# Patient Record
Sex: MALE | Race: WHITE | Employment: FULL TIME | URBAN - METROPOLITAN AREA
[De-identification: names, ages, dates, MRNs, and addresses within clinical notes are randomized per-mention and may not be internally consistent; named-entity substitution may affect disease eponyms.]

---

## 2017-10-23 LAB
CHOLESTEROL, TOTAL: NORMAL MG/DL
CHOLESTEROL/HDL RATIO: NORMAL
HDLC SERPL-MCNC: NORMAL MG/DL (ref 35–70)
LDL CHOLESTEROL CALCULATED: NORMAL MG/DL (ref 0–160)
TRIGL SERPL-MCNC: NORMAL MG/DL
VLDLC SERPL CALC-MCNC: NORMAL MG/DL

## 2019-03-27 ENCOUNTER — APPOINTMENT (OUTPATIENT)
Dept: CT IMAGING | Age: 64
End: 2019-03-27
Payer: OTHER GOVERNMENT

## 2019-03-27 ENCOUNTER — HOSPITAL ENCOUNTER (EMERGENCY)
Age: 64
Discharge: HOME | End: 2019-03-27
Attending: EMERGENCY MEDICINE
Payer: OTHER GOVERNMENT

## 2019-03-27 VITALS
HEART RATE: 67 BPM | BODY MASS INDEX: 30.31 KG/M2 | RESPIRATION RATE: 16 BRPM | HEIGHT: 68 IN | OXYGEN SATURATION: 95 % | WEIGHT: 200 LBS | DIASTOLIC BLOOD PRESSURE: 79 MMHG | TEMPERATURE: 98.6 F | SYSTOLIC BLOOD PRESSURE: 130 MMHG

## 2019-03-27 DIAGNOSIS — N40.0 BENIGN PROSTATIC HYPERPLASIA WITHOUT LOWER URINARY TRACT SYMPTOMS: ICD-10-CM

## 2019-03-27 DIAGNOSIS — M51.37 DEGENERATIVE DISC DISEASE AT L5-S1 LEVEL: ICD-10-CM

## 2019-03-27 DIAGNOSIS — R10.32 ABDOMINAL PAIN, LEFT LOWER QUADRANT: Primary | ICD-10-CM

## 2019-03-27 DIAGNOSIS — K76.0 HEPATIC STEATOSIS: ICD-10-CM

## 2019-03-27 LAB
ALBUMIN SERPL-MCNC: 4.5 G/DL (ref 3.5–5.2)
ALP BLD-CCNC: 55 U/L (ref 40–129)
ALT SERPL-CCNC: 20 U/L (ref 0–40)
ANION GAP SERPL CALCULATED.3IONS-SCNC: 8 MMOL/L (ref 7–16)
AST SERPL-CCNC: 23 U/L (ref 0–39)
BASOPHILS ABSOLUTE: 0.03 E9/L (ref 0–0.2)
BASOPHILS RELATIVE PERCENT: 0.4 % (ref 0–2)
BILIRUB SERPL-MCNC: 0.9 MG/DL (ref 0–1.2)
BILIRUBIN URINE: NEGATIVE
BLOOD, URINE: NEGATIVE
BUN BLDV-MCNC: 20 MG/DL (ref 8–23)
CALCIUM SERPL-MCNC: 9.5 MG/DL (ref 8.6–10.2)
CHLORIDE BLD-SCNC: 102 MMOL/L (ref 98–107)
CLARITY: CLEAR
CO2: 28 MMOL/L (ref 22–29)
COLOR: NORMAL
CREAT SERPL-MCNC: 1.1 MG/DL (ref 0.7–1.2)
EOSINOPHILS ABSOLUTE: 0.15 E9/L (ref 0.05–0.5)
EOSINOPHILS RELATIVE PERCENT: 2.2 % (ref 0–6)
GFR AFRICAN AMERICAN: >60
GFR NON-AFRICAN AMERICAN: >60 ML/MIN/1.73
GLUCOSE BLD-MCNC: 110 MG/DL (ref 74–99)
GLUCOSE URINE: NEGATIVE MG/DL
HCT VFR BLD CALC: 43.2 % (ref 37–54)
HEMOGLOBIN: 14.8 G/DL (ref 12.5–16.5)
IMMATURE GRANULOCYTES #: 0.01 E9/L
IMMATURE GRANULOCYTES %: 0.1 % (ref 0–5)
KETONES, URINE: NEGATIVE MG/DL
LACTIC ACID: 1.2 MMOL/L (ref 0.5–2.2)
LEUKOCYTE ESTERASE, URINE: NEGATIVE
LIPASE: 29 U/L (ref 13–60)
LYMPHOCYTES ABSOLUTE: 1.21 E9/L (ref 1.5–4)
LYMPHOCYTES RELATIVE PERCENT: 18 % (ref 20–42)
MCH RBC QN AUTO: 32.5 PG (ref 26–35)
MCHC RBC AUTO-ENTMCNC: 34.3 % (ref 32–34.5)
MCV RBC AUTO: 94.7 FL (ref 80–99.9)
MONOCYTES ABSOLUTE: 0.66 E9/L (ref 0.1–0.95)
MONOCYTES RELATIVE PERCENT: 9.8 % (ref 2–12)
NEUTROPHILS ABSOLUTE: 4.65 E9/L (ref 1.8–7.3)
NEUTROPHILS RELATIVE PERCENT: 69.5 % (ref 43–80)
NITRITE, URINE: NEGATIVE
PDW BLD-RTO: 13.3 FL (ref 11.5–15)
PH UA: 7 (ref 5–9)
PLATELET # BLD: 136 E9/L (ref 130–450)
PMV BLD AUTO: 11.3 FL (ref 7–12)
POTASSIUM SERPL-SCNC: 4.4 MMOL/L (ref 3.5–5)
PROTEIN UA: NEGATIVE MG/DL
RBC # BLD: 4.56 E12/L (ref 3.8–5.8)
SODIUM BLD-SCNC: 138 MMOL/L (ref 132–146)
SPECIFIC GRAVITY UA: 1.01 (ref 1–1.03)
TOTAL PROTEIN: 6.9 G/DL (ref 6.4–8.3)
TSH SERPL DL<=0.05 MIU/L-ACNC: 1.93 UIU/ML (ref 0.27–4.2)
UROBILINOGEN, URINE: 0.2 E.U./DL
WBC # BLD: 6.7 E9/L (ref 4.5–11.5)

## 2019-03-27 PROCEDURE — 2580000003 HC RX 258: Performed by: EMERGENCY MEDICINE

## 2019-03-27 PROCEDURE — 83605 ASSAY OF LACTIC ACID: CPT

## 2019-03-27 PROCEDURE — 81003 URINALYSIS AUTO W/O SCOPE: CPT

## 2019-03-27 PROCEDURE — 74177 CT ABD & PELVIS W/CONTRAST: CPT

## 2019-03-27 PROCEDURE — 99284 EMERGENCY DEPT VISIT MOD MDM: CPT

## 2019-03-27 PROCEDURE — 83690 ASSAY OF LIPASE: CPT

## 2019-03-27 PROCEDURE — 84443 ASSAY THYROID STIM HORMONE: CPT

## 2019-03-27 PROCEDURE — 80053 COMPREHEN METABOLIC PANEL: CPT

## 2019-03-27 PROCEDURE — 85025 COMPLETE CBC W/AUTO DIFF WBC: CPT

## 2019-03-27 PROCEDURE — 6360000004 HC RX CONTRAST MEDICATION: Performed by: RADIOLOGY

## 2019-03-27 RX ORDER — TAMSULOSIN HYDROCHLORIDE 0.4 MG/1
0.4 CAPSULE ORAL DAILY
COMMUNITY
End: 2019-06-24 | Stop reason: SDUPTHER

## 2019-03-27 RX ORDER — OXYMETAZOLINE HYDROCHLORIDE 0.05 G/100ML
2 SPRAY NASAL 2 TIMES DAILY
COMMUNITY
End: 2019-06-24 | Stop reason: SDUPTHER

## 2019-03-27 RX ORDER — MONTELUKAST SODIUM 10 MG/1
10 TABLET ORAL DAILY
COMMUNITY
End: 2019-12-18 | Stop reason: SDUPTHER

## 2019-03-27 RX ORDER — OMEPRAZOLE 40 MG/1
40 CAPSULE, DELAYED RELEASE ORAL DAILY
COMMUNITY
End: 2019-08-23

## 2019-03-27 RX ORDER — SODIUM CHLORIDE 0.9 % (FLUSH) 0.9 %
10 SYRINGE (ML) INJECTION PRN
Status: DISCONTINUED | OUTPATIENT
Start: 2019-03-27 | End: 2019-03-27 | Stop reason: HOSPADM

## 2019-03-27 RX ADMIN — Medication 10 ML: at 11:20

## 2019-03-27 RX ADMIN — IOPAMIDOL 110 ML: 755 INJECTION, SOLUTION INTRAVENOUS at 12:20

## 2019-03-27 ASSESSMENT — PAIN DESCRIPTION - PAIN TYPE: TYPE: ACUTE PAIN

## 2019-03-27 ASSESSMENT — ENCOUNTER SYMPTOMS
CONSTIPATION: 1
EYE REDNESS: 0
SHORTNESS OF BREATH: 0
SINUS PRESSURE: 0
SORE THROAT: 0
EYE DISCHARGE: 0
DIARRHEA: 0
ABDOMINAL PAIN: 1
WHEEZING: 0
NAUSEA: 0
COUGH: 0
ANAL BLEEDING: 1
EYE PAIN: 0
HEMATOCHEZIA: 1
VOMITING: 0
BACK PAIN: 0
BELCHING: 0

## 2019-03-27 ASSESSMENT — PAIN DESCRIPTION - LOCATION: LOCATION: ABDOMEN

## 2019-03-27 ASSESSMENT — PAIN DESCRIPTION - DESCRIPTORS: DESCRIPTORS: DISCOMFORT

## 2019-03-27 ASSESSMENT — PAIN DESCRIPTION - ORIENTATION: ORIENTATION: LEFT;LOWER

## 2019-03-27 ASSESSMENT — PAIN SCALES - GENERAL: PAINLEVEL_OUTOF10: 3

## 2019-05-08 ENCOUNTER — OFFICE VISIT (OUTPATIENT)
Dept: FAMILY MEDICINE CLINIC | Age: 64
End: 2019-05-08
Payer: OTHER GOVERNMENT

## 2019-05-08 VITALS
DIASTOLIC BLOOD PRESSURE: 82 MMHG | HEART RATE: 76 BPM | BODY MASS INDEX: 31.25 KG/M2 | TEMPERATURE: 97.8 F | WEIGHT: 206.2 LBS | HEIGHT: 68 IN | OXYGEN SATURATION: 97 % | SYSTOLIC BLOOD PRESSURE: 126 MMHG

## 2019-05-08 DIAGNOSIS — M51.37 DEGENERATIVE DISC DISEASE AT L5-S1 LEVEL: ICD-10-CM

## 2019-05-08 DIAGNOSIS — S61.309A: Primary | ICD-10-CM

## 2019-05-08 DIAGNOSIS — J30.9 CHRONIC ALLERGIC RHINITIS: ICD-10-CM

## 2019-05-08 PROBLEM — M51.379 DEGENERATIVE DISC DISEASE AT L5-S1 LEVEL: Status: ACTIVE | Noted: 2019-05-08

## 2019-05-08 PROCEDURE — 99213 OFFICE O/P EST LOW 20 MIN: CPT | Performed by: NURSE PRACTITIONER

## 2019-05-08 PROCEDURE — 10120 INC&RMVL FB SUBQ TISS SMPL: CPT | Performed by: NURSE PRACTITIONER

## 2019-05-08 RX ORDER — CYCLOBENZAPRINE HCL 10 MG
10 TABLET ORAL NIGHTLY PRN
Qty: 30 TABLET | Refills: 0 | Status: SHIPPED | OUTPATIENT
Start: 2019-05-08 | End: 2019-05-18

## 2019-05-08 ASSESSMENT — ENCOUNTER SYMPTOMS
BACK PAIN: 1
RESPIRATORY NEGATIVE: 1
EYES NEGATIVE: 1
RHINORRHEA: 1

## 2019-05-08 NOTE — LETTER
[address]  [facility] [address]  Phone: [phone]  Fax: [address] [place] [address], JACQUELINE - CNP        May 8, 2019     Patient: Ketan Horta   YOB: 1955   Date of Visit: 5/8/2019       To Whom It May Concern: It is my medical opinion that Ketan Horta should remain out of work until 6-1-19. If you have any questions or concerns, please don't hesitate to call.     Sincerely,        Rolando Hernandez, APRN - CNP

## 2019-05-08 NOTE — PATIENT INSTRUCTIONS
Patient Education        Back Pain: Care Instructions  Your Care Instructions    Back pain has many possible causes. It is often related to problems with muscles and ligaments of the back. It may also be related to problems with the nerves, discs, or bones of the back. Moving, lifting, standing, sitting, or sleeping in an awkward way can strain the back. Sometimes you don't notice the injury until later. Arthritis is another common cause of back pain. Although it may hurt a lot, back pain usually improves on its own within several weeks. Most people recover in 12 weeks or less. Using good home treatment and being careful not to stress your back can help you feel better sooner. Follow-up care is a key part of your treatment and safety. Be sure to make and go to all appointments, and call your doctor if you are having problems. It's also a good idea to know your test results and keep a list of the medicines you take. How can you care for yourself at home? · Sit or lie in positions that are most comfortable and reduce your pain. Try one of these positions when you lie down:  ? Lie on your back with your knees bent and supported by large pillows. ? Lie on the floor with your legs on the seat of a sofa or chair. ? Lie on your side with your knees and hips bent and a pillow between your legs. ? Lie on your stomach if it does not make pain worse. · Do not sit up in bed, and avoid soft couches and twisted positions. Bed rest can help relieve pain at first, but it delays healing. Avoid bed rest after the first day of back pain. · Change positions every 30 minutes. If you must sit for long periods of time, take breaks from sitting. Get up and walk around, or lie in a comfortable position. · Try using a heating pad on a low or medium setting for 15 to 20 minutes every 2 or 3 hours. Try a warm shower in place of one session with the heating pad.   · You can also try an ice pack for 10 to 15 minutes every 2 to 3 hours. Put a thin cloth between the ice pack and your skin. · Take pain medicines exactly as directed. ? If the doctor gave you a prescription medicine for pain, take it as prescribed. ? If you are not taking a prescription pain medicine, ask your doctor if you can take an over-the-counter medicine. · Take short walks several times a day. You can start with 5 to 10 minutes, 3 or 4 times a day, and work up to longer walks. Walk on level surfaces and avoid hills and stairs until your back is better. · Return to work and other activities as soon as you can. Continued rest without activity is usually not good for your back. · To prevent future back pain, do exercises to stretch and strengthen your back and stomach. Learn how to use good posture, safe lifting techniques, and proper body mechanics. When should you call for help? Call your doctor now or seek immediate medical care if:    · You have new or worsening numbness in your legs.     · You have new or worsening weakness in your legs. (This could make it hard to stand up.)     · You lose control of your bladder or bowels.    Watch closely for changes in your health, and be sure to contact your doctor if:    · You have a fever, lose weight, or don't feel well.     · You do not get better as expected. Where can you learn more? Go to https://Exit41.Repairogen. org and sign in to your Movetis account. Enter P225 in the [facility] box to learn more about \"Back Pain: Care Instructions. \"     If you do not have an account, please click on the \"Sign Up Now\" link. Current as of: September 20, 2018  Content Version: 12.0  © 0378-6243 Healthwise, Incorporated. Care instructions adapted under license by [facility] ([facility]). If you have questions about a medical condition or this instruction, always ask your healthcare professional. [place] [address] any warranty or liability for your use of this information.          Patient Education sign in to your Stockdrift account. Enter O580 in the [employer] box to learn more about \"Back Stretches: Exercises. \"     If you do not have an account, please click on the \"Sign Up Now\" link. Current as of: September 20, 2018  Content Version: 12.0  © 6187-8491 [employer]. Care instructions adapted under license by [facility] ([facility]). If you have questions about a medical condition or this instruction, always ask your healthcare professional. [address] any warranty or liability for your use of this information. Patient Education        Therapeutic Ball: Back Exercises  Your Care Instructions  Here are some examples of typical exercises for your condition. Start each exercise slowly. Ease off the exercise if you start to have pain. Your doctor or physical therapist will tell you when you can start these exercises and which ones will work best for you. To prepare, make sure that your ball is the right size for you. When inflated and firm, it should allow you to sit with your hips and knees bent at about a 90-degree angle (like the letter L). How to do the exercises  Seated position on ball    1. Use this exercise to get used to moving on the ball and to find your best sitting position. 2. Sit comfortably on the ball with your feet about hip-width apart. If you feel unsteady, rest your hands on the ball near your hips. 3. As you do this exercise, try to keep your shoulders and upper body relaxed and still. 4. Using your stomach and back muscles to move your pelvis, roll the ball forward. This will round your back. 5. Still using your stomach and back muscles, roll the ball back. You will arch your back. 6. Repeat this rounding-arching motion a few times. 7. Stop in between the two positions, where your back is not rounded or arched. This is called your neutral position. Pelvic rotation    1. Sit tall on the ball.   2. Slowly rotate your hips in a [place] pattern. Keep the movement focused at your hips. 3. Repeat, but [place] in the other direction. 4. Repeat 8 to 12 times. Postural sitting    1. Use this position to find a stable, relaxed posture on the ball. You can use this position as your starting point for other ball exercises. If you feel unsteady on the ball, start on a chair first.  2. Sit on a ball or chair, with your feet planted straight in front of you. 3. Imagine that a string at the top of your head is pulling you straight up. Think of yourself as 2 inches taller than you are.  4. Slightly tuck your chin. 5. Keep your shoulders back and relaxed. Knee extension    1. Sit tall on the ball with your feet planted in front of you, hip-width apart. As you do this exercise, avoid slumping your shoulders and arching your back. 2. Rest your hands on the ball near your hip or a steady object next to you. (If you feel very stable on the ball, rest your hands in your lap or at your side.)  3. Slowly straighten one leg at the knee. Slowly lower it back down. Repeat with the other leg. 4. Repeat this exercise 8 to 12 times. Roll-ups    1. Lie on your back with your knees bent, feet resting on the floor. 2. Lay the ball on your thighs. Rest your hands up high on the ball. 3. Raising your head and shoulder blades, roll the ball up your thighs. Exhale as you roll up. 4. If this is hard on your neck, gently support your lower head and upper neck with one hand. Don't use that hand to pull your head up. 5. Repeat 8 to 12 times. Ball curls    1. Lie on your back with your ankles resting on the ball, knees straight. 2. Use your legs to roll the exercise ball toward you. Allow your knees to bend and move closer to your chest.  3. Pause briefly, and then roll the ball to the starting position. Try to keep the ball rolling straight. You will feel the muscles in your lower belly working. 4. Repeat 8 to 12 times. Bridge with ball under legs    1.  Lie on your back with your legs up, calves resting on the ball. For more challenge, rest your heels on the ball. 2. Look up at the ceiling, and keep your chin relaxed. You can place a small pillow under your head or neck for comfort. 3. With your arms by your side, press your hands onto the floor for stability. 4. Tighten your belly muscles by pulling in your belly button toward your spine. 5. Push your heels down toward the floor, squeeze your buttocks, and lift your hips off the floor until your shoulders, hips, and knees are all in a straight line. 6. Try to keep the ball steady. Hold for about 6 seconds as you continue to breathe normally. 7. Slowly lower your hips back down to the floor. 8. Repeat 8 to 12 times. Ball curls with bridge    1. Start flat on your back with your ankles resting on the ball. 2. Look up at the ceiling, and keep your chin relaxed. You can place a small pillow under your head or neck for comfort. 3. With your arms by your side, press your hands onto the floor for stability. 4. Tighten your belly muscles by pulling in your belly button toward your spine. 5. Push your heels down toward the floor, squeeze your buttocks, and lift your hips off the floor until your shoulders, hips, and knees are all in a straight line. 6. While holding the bridge position, roll the ball toward you with your heels. Keep your hips as level as you can. 7. Pause briefly, and then roll the ball back out. Try to keep the ball rolling straight. You will feel the muscles in your lower belly working as you straighten your legs. 8. Lower your hips, and return to your starting position. 9. Repeat 8 to 12 times. 10. When you can keep your body and the ball steady throughout this exercise, you're ready for more challenge. Try keeping your hips raised while rolling the ball out, holding the bridge, and rolling back, a few times in a row. Praying idris    1.  Kneel upright with the ball in front of you.  2. To start, clasp your hands together. Rest them on the ball in front of you. 3. As you do this exercise, keep your back and hips straight and tighten your belly and buttocks muscles. Keep your knees in place. 4. Press on the ball with your arms. Lean forward from the knees. This rolls the ball forward. You will bear most of your weight on your arms. 5. If your back starts to ache, you've gone too far. Pull back a bit. 6. Roll back to the start position. 7. Repeat 8 to 12 times. Walk-out plank on ball    1. Kneel over the ball. Place your hands on the floor in front of you. 2. Walk your hands forward until your legs are straight on the ball. This is the plank position. 3. When in plank position, hold your body straight and tighten your belly and buttocks muscles. Keep your chin slightly tucked. 4. Roll as far forward as you can without losing your balance or letting your hips drop. You may stop with the ball under your thighs, or even under your knees or shins. 5. Hold a few seconds, then walk your hands back and return to the start position. 6. Repeat 8 to 12 times. Push-up with thighs on ball    1. Kneel over the ball. Place your hands on the floor in front of you. 2. Walk your hands forward until your legs are straight on the ball. This is the plank position. 3. When in plank position, hold your body straight and tighten your belly and buttocks muscles. Keep your chin slightly tucked. 4. Roll as far forward as you can without losing your balance or letting your hips drop. You may stop with the ball under your thighs, or even under your knees or shins. 5. Bend your elbows. Slowly lower your body toward the ground as far as you can without losing your balance. 6. If your wrists hurt, try moving your hands a little farther apart so they're not right under your shoulders. 7. Slowly straighten your arms. 8. Do 8 to 12 of these push-ups. Wall squat with ball    1.  Stand facing away from

## 2019-05-08 NOTE — PROGRESS NOTES
2019     Vlad Ocasio (:  1955) is a 61 y.o. male, here for evaluation of the following medical concerns:  Chief Complaint   Patient presents with    Lower Back Pain     entire back    Finger Pain     thumb        HPI:  61 y.o. male presents for back pain that is progressively worsening. Has L5-S1 disc disease. Finding movement beyond just minimal walking painful. Also injured right thumb and thinks foreign body is in it. UTD with Td. Past Medical History:   Diagnosis Date    BPH (benign prostatic hyperplasia)     Colonic fistula     Seasonal allergies        Current Outpatient Medications on File Prior to Visit   Medication Sig Dispense Refill    tamsulosin (FLOMAX) 0.4 MG capsule Take 0.4 mg by mouth daily      montelukast (SINGULAIR) 10 MG tablet Take 10 mg by mouth daily      omeprazole (PRILOSEC) 40 MG delayed release capsule Take 40 mg by mouth daily      oxymetazoline (QC NASAL RELIEF MOISTURIZING) 0.05 % nasal spray 2 sprays by Nasal route 2 times daily       No current facility-administered medications on file prior to visit. Allergies   Allergen Reactions    Penicillins Hives       No family history on file. Past Surgical History:   Procedure Laterality Date    COLON SURGERY         Social History     Tobacco Use    Smoking status: Never Smoker    Smokeless tobacco: Never Used   Substance Use Topics    Alcohol use: Yes     Alcohol/week: 1.2 oz     Types: 2 Shots of liquor per week    Drug use: Never       ROS:      Review of Systems   Constitutional: Positive for activity change. HENT: Positive for congestion and rhinorrhea. Eyes: Negative. Respiratory: Negative. Musculoskeletal: Positive for back pain.                Vitals:    19 1546   BP: 126/82   Pulse: 76   Temp: 97.8 °F (36.6 °C)   SpO2: 97%   Weight: 206 lb 3.2 oz (93.5 kg)   Height: 5' 8\" (1.727 m)     Estimated body mass index is 31.35 kg/m² as calculated from the following: Height as of this encounter: 5' 8\" (1.727 m). Weight as of this encounter: 206 lb 3.2 oz (93.5 kg). PHYSICAL EXAM:    VS:  Blood pressure 126/82, pulse 76, temperature 97.8 °F (36.6 °C), height 5' 8\" (1.727 m), weight 206 lb 3.2 oz (93.5 kg), SpO2 97 %. Physical Exam   Constitutional: He appears well-developed and well-nourished. Eyes: Pupils are equal, round, and reactive to light. EOM are normal.   Cardiovascular: Normal rate, regular rhythm and intact distal pulses. Pulmonary/Chest: Effort normal and breath sounds normal.   Musculoskeletal: Normal range of motion. He exhibits tenderness. He exhibits no edema or deformity. Lumbar back: He exhibits tenderness and spasm. Hands:       ASSESSMENT/PLAN:    Caleb Buenrostro was seen today for lower back pain and finger pain. Diagnoses and all orders for this visit:    Open wound of fingernail without complication, initial encounter    Chronic allergic rhinitis    Degenerative disc disease at L5-S1 level    Other orders  -     cyclobenzaprine (FLEXERIL) 10 MG tablet; Take 1 tablet by mouth nightly as needed for Muscle spasms  -     mupirocin (BACTROBAN) 2 % ointment; Apply 3 times daily. Discussed plan for his back. Call if not improving and consider PT  Removed gravel  From right thumb. Minor bleeding. Keep clean and dry. Use ATB ointment. No orders of the defined types were placed in this encounter. No follow-ups on file. An electronic signature was used to authenticate this note.     --Miguel A Cha, JACQUELINE - CNP on 5/8/2019 at 4:59 PM

## 2019-06-24 RX ORDER — TAMSULOSIN HYDROCHLORIDE 0.4 MG/1
0.4 CAPSULE ORAL DAILY
Qty: 90 CAPSULE | Refills: 1 | Status: SHIPPED | OUTPATIENT
Start: 2019-06-24 | End: 2019-06-26 | Stop reason: SDUPTHER

## 2019-06-24 RX ORDER — OXYMETAZOLINE HYDROCHLORIDE 0.05 G/100ML
2 SPRAY NASAL 2 TIMES DAILY
Qty: 3 BOTTLE | Refills: 2 | Status: SHIPPED | OUTPATIENT
Start: 2019-06-24 | End: 2019-12-18 | Stop reason: SDUPTHER

## 2019-06-26 RX ORDER — TAMSULOSIN HYDROCHLORIDE 0.4 MG/1
0.4 CAPSULE ORAL DAILY
Qty: 90 CAPSULE | Refills: 1 | Status: SHIPPED | OUTPATIENT
Start: 2019-06-26 | End: 2019-12-18 | Stop reason: SDUPTHER

## 2019-08-02 ENCOUNTER — TELEPHONE (OUTPATIENT)
Dept: PRIMARY CARE CLINIC | Age: 64
End: 2019-08-02

## 2019-08-05 NOTE — TELEPHONE ENCOUNTER
Patient called back, has not received a call about the referral. He is an  and is not available all the time. Needs to know if referral was done. Does not want to wait for Kg to return from vacation. Please contact patient.

## 2019-08-06 ENCOUNTER — TELEPHONE (OUTPATIENT)
Dept: ADMINISTRATIVE | Age: 64
End: 2019-08-06

## 2019-08-23 ENCOUNTER — TELEPHONE (OUTPATIENT)
Dept: FAMILY MEDICINE CLINIC | Age: 64
End: 2019-08-23

## 2019-08-23 ENCOUNTER — OFFICE VISIT (OUTPATIENT)
Dept: FAMILY MEDICINE CLINIC | Age: 64
End: 2019-08-23
Payer: OTHER GOVERNMENT

## 2019-08-23 VITALS
SYSTOLIC BLOOD PRESSURE: 124 MMHG | TEMPERATURE: 97.4 F | WEIGHT: 208.4 LBS | HEIGHT: 69 IN | HEART RATE: 71 BPM | DIASTOLIC BLOOD PRESSURE: 82 MMHG | OXYGEN SATURATION: 98 % | BODY MASS INDEX: 30.87 KG/M2

## 2019-08-23 DIAGNOSIS — R39.198 URINARY STREAM SLOWING: ICD-10-CM

## 2019-08-23 DIAGNOSIS — M51.9 LUMBAR DISC DISEASE: Primary | ICD-10-CM

## 2019-08-23 DIAGNOSIS — N40.1 BENIGN PROSTATIC HYPERPLASIA (BPH) WITH STRAINING ON URINATION: ICD-10-CM

## 2019-08-23 DIAGNOSIS — J30.2 SEASONAL ALLERGIES: ICD-10-CM

## 2019-08-23 DIAGNOSIS — G47.9 SLEEP DISORDER: ICD-10-CM

## 2019-08-23 DIAGNOSIS — Z00.00 ROUTINE GENERAL MEDICAL EXAMINATION AT A HEALTH CARE FACILITY: ICD-10-CM

## 2019-08-23 DIAGNOSIS — Z12.5 SCREENING PSA (PROSTATE SPECIFIC ANTIGEN): ICD-10-CM

## 2019-08-23 DIAGNOSIS — R39.16 BENIGN PROSTATIC HYPERPLASIA (BPH) WITH STRAINING ON URINATION: ICD-10-CM

## 2019-08-23 PROCEDURE — 99396 PREV VISIT EST AGE 40-64: CPT | Performed by: NURSE PRACTITIONER

## 2019-08-23 RX ORDER — ZOLPIDEM TARTRATE 10 MG/1
10 TABLET ORAL NIGHTLY PRN
Qty: 90 TABLET | Refills: 0 | Status: SHIPPED | OUTPATIENT
Start: 2019-08-23 | End: 2019-11-21

## 2019-08-23 RX ORDER — OMEPRAZOLE 20 MG/1
CAPSULE, DELAYED RELEASE ORAL
COMMUNITY
Start: 2019-07-25 | End: 2019-12-18 | Stop reason: SDUPTHER

## 2019-08-23 RX ORDER — ZOLPIDEM TARTRATE 10 MG/1
TABLET ORAL
COMMUNITY
Start: 2014-08-06 | End: 2019-08-23 | Stop reason: SDUPTHER

## 2019-08-23 RX ORDER — ASCORBATE CALCIUM 500 MG
TABLET ORAL
COMMUNITY
Start: 2003-06-17

## 2019-08-23 RX ORDER — ATOVAQUONE AND PROGUANIL HYDROCHLORIDE 250; 100 MG/1; MG/1
TABLET, FILM COATED ORAL
Refills: 5 | COMMUNITY
Start: 2019-08-20 | End: 2022-07-25

## 2019-08-23 RX ORDER — EPINEPHRINE 0.3 MG/.3ML
INJECTION SUBCUTANEOUS
COMMUNITY
Start: 2018-06-27 | End: 2022-10-24 | Stop reason: SDUPTHER

## 2019-08-23 SDOH — HEALTH STABILITY: MENTAL HEALTH: HOW MANY STANDARD DRINKS CONTAINING ALCOHOL DO YOU HAVE ON A TYPICAL DAY?: 1 OR 2

## 2019-08-23 SDOH — HEALTH STABILITY: MENTAL HEALTH: HOW OFTEN DO YOU HAVE A DRINK CONTAINING ALCOHOL?: 2-3 TIMES A WEEK

## 2019-08-23 ASSESSMENT — PATIENT HEALTH QUESTIONNAIRE - PHQ9
2. FEELING DOWN, DEPRESSED OR HOPELESS: 0
SUM OF ALL RESPONSES TO PHQ9 QUESTIONS 1 & 2: 0
SUM OF ALL RESPONSES TO PHQ QUESTIONS 1-9: 0
SUM OF ALL RESPONSES TO PHQ QUESTIONS 1-9: 0
1. LITTLE INTEREST OR PLEASURE IN DOING THINGS: 0

## 2019-08-23 ASSESSMENT — ENCOUNTER SYMPTOMS
BACK PAIN: 1
EYES NEGATIVE: 1
RESPIRATORY NEGATIVE: 1
GASTROINTESTINAL NEGATIVE: 1
RHINORRHEA: 1

## 2019-08-23 NOTE — PROGRESS NOTES
2019     [place] Blade (:  1955) is a 61 y.o. male, here for evaluation of the following medical concerns:  Chief Complaint   Patient presents with    Back Pain        HPI:  61 y.o. male presents for check up but continues with back pain that is progressively worsening. Has L5-S1 disc disease. Finding movement beyond just minimal walking painful. Sitting is miserable and he is a . Discussed next step. Will obtain MRI and have him see Dr Tressa Gu. Having more urinary issues. Time to see urology  No other specific problems at this time. Due lab  Still needs the ambien on occasion with his flight sched  Allergies average    Health Maintenance   Colonoscopy       Past Medical History:   Diagnosis Date    BPH (benign prostatic hyperplasia)     Colonic fistula     Lumbar disc disease     Seasonal allergies        Current Outpatient Medications on File Prior to Visit   Medication Sig Dispense Refill    Saw Palmetto, Serenoa repens, (SAW PALMETTO EXTRACT PO) . qd      EPINEPHrine (EPIPEN 2-NGOC) 0.3 MG/0.3ML SOAJ injection       Calcium Ascorbate 500 MG TABS . bid      omeprazole (PRILOSEC) 20 MG delayed release capsule       Potassium 75 MG TABS       atovaquone-proguanil (MALARONE) 250-100 MG per tablet TAKE 1 TABLET BY MOUTH EVERY 24 HOURS WITH FOOD OR MILK. START 1 TO 2 DAYS BEFORE ENTERING MALARIA AREA  CONTINUE DURING STAY AND FOR 7 DAYS  5    tamsulosin (FLOMAX) 0.4 MG capsule Take 1 capsule by mouth daily 90 capsule 1    oxymetazoline (QC NASAL RELIEF MOISTURIZING) 0.05 % nasal spray 2 sprays by Nasal route 2 times daily 3 Bottle 2    montelukast (SINGULAIR) 10 MG tablet Take 10 mg by mouth daily       No current facility-administered medications on file prior to visit.         Allergies   Allergen Reactions    Penicillins Hives    Bee Venom        Family History   Problem Relation Age of Onset    Other Mother         during surgery at 80    Dementia Father        Past

## 2019-08-26 ENCOUNTER — TELEPHONE (OUTPATIENT)
Dept: ADMINISTRATIVE | Age: 64
End: 2019-08-26

## 2019-08-29 ENCOUNTER — TELEPHONE (OUTPATIENT)
Dept: FAMILY MEDICINE CLINIC | Age: 64
End: 2019-08-29

## 2019-08-29 DIAGNOSIS — R39.16 BENIGN PROSTATIC HYPERPLASIA (BPH) WITH STRAINING ON URINATION: Primary | ICD-10-CM

## 2019-08-29 DIAGNOSIS — N40.1 BENIGN PROSTATIC HYPERPLASIA (BPH) WITH STRAINING ON URINATION: Primary | ICD-10-CM

## 2019-08-29 LAB
ALBUMIN SERPL-MCNC: NORMAL G/DL
ALP BLD-CCNC: NORMAL U/L
ALT SERPL-CCNC: NORMAL U/L
ANION GAP SERPL CALCULATED.3IONS-SCNC: NORMAL MMOL/L
AST SERPL-CCNC: NORMAL U/L
BASOPHILS ABSOLUTE: NORMAL /ΜL
BASOPHILS RELATIVE PERCENT: NORMAL %
BILIRUB SERPL-MCNC: NORMAL MG/DL (ref 0.1–1.4)
BUN BLDV-MCNC: NORMAL MG/DL
CALCIUM SERPL-MCNC: NORMAL MG/DL
CHLORIDE BLD-SCNC: NORMAL MMOL/L
CHOLESTEROL, TOTAL: NORMAL MG/DL
CHOLESTEROL/HDL RATIO: NORMAL
CO2: NORMAL MMOL/L
CREAT SERPL-MCNC: NORMAL MG/DL
EOSINOPHILS ABSOLUTE: NORMAL /ΜL
EOSINOPHILS RELATIVE PERCENT: NORMAL %
GFR CALCULATED: NORMAL
GLUCOSE BLD-MCNC: NORMAL MG/DL
HCT VFR BLD CALC: NORMAL % (ref 41–53)
HDLC SERPL-MCNC: NORMAL MG/DL (ref 35–70)
HEMOGLOBIN: NORMAL G/DL (ref 13.5–17.5)
LDL CHOLESTEROL CALCULATED: NORMAL MG/DL (ref 0–160)
LYMPHOCYTES ABSOLUTE: NORMAL /ΜL
LYMPHOCYTES RELATIVE PERCENT: NORMAL %
MCH RBC QN AUTO: NORMAL PG
MCHC RBC AUTO-ENTMCNC: NORMAL G/DL
MCV RBC AUTO: NORMAL FL
MONOCYTES ABSOLUTE: NORMAL /ΜL
MONOCYTES RELATIVE PERCENT: NORMAL %
NEUTROPHILS ABSOLUTE: NORMAL /ΜL
NEUTROPHILS RELATIVE PERCENT: NORMAL %
PDW BLD-RTO: NORMAL %
PLATELET # BLD: NORMAL K/ΜL
PMV BLD AUTO: NORMAL FL
POTASSIUM SERPL-SCNC: NORMAL MMOL/L
RBC # BLD: NORMAL 10^6/ΜL
SODIUM BLD-SCNC: NORMAL MMOL/L
TOTAL PROTEIN: NORMAL
TRIGL SERPL-MCNC: NORMAL MG/DL
VLDLC SERPL CALC-MCNC: NORMAL MG/DL
WBC # BLD: NORMAL 10^3/ML

## 2019-09-05 ENCOUNTER — HOSPITAL ENCOUNTER (OUTPATIENT)
Dept: MRI IMAGING | Age: 64
Discharge: HOME | End: 2019-09-07
Payer: OTHER GOVERNMENT

## 2019-09-05 DIAGNOSIS — M51.9 LUMBAR DISC DISEASE: ICD-10-CM

## 2019-09-05 PROCEDURE — 72148 MRI LUMBAR SPINE W/O DYE: CPT

## 2019-09-11 ENCOUNTER — TELEPHONE (OUTPATIENT)
Dept: PRIMARY CARE CLINIC | Age: 64
End: 2019-09-11

## 2019-09-16 ENCOUNTER — INITIAL CONSULT (OUTPATIENT)
Dept: NEUROSURGERY | Age: 64
End: 2019-09-16
Payer: OTHER GOVERNMENT

## 2019-09-16 VITALS
DIASTOLIC BLOOD PRESSURE: 97 MMHG | BODY MASS INDEX: 32.29 KG/M2 | WEIGHT: 218 LBS | SYSTOLIC BLOOD PRESSURE: 137 MMHG | HEART RATE: 90 BPM | HEIGHT: 69 IN

## 2019-09-16 DIAGNOSIS — M48.061 DEGENERATIVE LUMBAR SPINAL STENOSIS: ICD-10-CM

## 2019-09-16 PROCEDURE — 99203 OFFICE O/P NEW LOW 30 MIN: CPT | Performed by: PHYSICIAN ASSISTANT

## 2019-09-16 NOTE — TELEPHONE ENCOUNTER
The patient called back and wants to be referred to Dr. Jermaine Martinez. He is in the system.  Please place referral.

## 2019-10-18 ENCOUNTER — OFFICE VISIT (OUTPATIENT)
Dept: FAMILY MEDICINE CLINIC | Age: 64
End: 2019-10-18
Payer: OTHER GOVERNMENT

## 2019-10-18 VITALS
OXYGEN SATURATION: 96 % | DIASTOLIC BLOOD PRESSURE: 100 MMHG | HEART RATE: 102 BPM | BODY MASS INDEX: 32.02 KG/M2 | SYSTOLIC BLOOD PRESSURE: 146 MMHG | WEIGHT: 216.8 LBS | TEMPERATURE: 98.2 F

## 2019-10-18 DIAGNOSIS — M48.061 DEGENERATIVE LUMBAR SPINAL STENOSIS: Primary | ICD-10-CM

## 2019-10-18 DIAGNOSIS — R03.0 TEMPORARY HIGH BLOOD PRESSURE: ICD-10-CM

## 2019-10-18 PROCEDURE — 99213 OFFICE O/P EST LOW 20 MIN: CPT | Performed by: NURSE PRACTITIONER

## 2019-10-18 RX ORDER — LISINOPRIL 30 MG/1
30 TABLET ORAL DAILY
Qty: 30 TABLET | Refills: 5 | Status: SHIPPED | OUTPATIENT
Start: 2019-10-18 | End: 2020-01-17 | Stop reason: SDUPTHER

## 2019-10-18 RX ORDER — DICLOFENAC SODIUM 75 MG/1
75 TABLET, DELAYED RELEASE ORAL 2 TIMES DAILY PRN
Qty: 60 TABLET | Refills: 1 | Status: SHIPPED | OUTPATIENT
Start: 2019-10-18 | End: 2019-12-18 | Stop reason: ALTCHOICE

## 2019-10-18 ASSESSMENT — ENCOUNTER SYMPTOMS
SINUS PAIN: 0
CHEST TIGHTNESS: 0
CONSTIPATION: 0
ABDOMINAL PAIN: 0
SORE THROAT: 0
EYE PAIN: 0
TROUBLE SWALLOWING: 0
SHORTNESS OF BREATH: 0
SINUS PRESSURE: 0
EYE DISCHARGE: 0
DIARRHEA: 0
COUGH: 0
BACK PAIN: 1

## 2019-12-18 ENCOUNTER — OFFICE VISIT (OUTPATIENT)
Dept: PRIMARY CARE CLINIC | Age: 64
End: 2019-12-18
Payer: OTHER GOVERNMENT

## 2019-12-18 VITALS
WEIGHT: 230 LBS | BODY MASS INDEX: 33.97 KG/M2 | OXYGEN SATURATION: 98 % | DIASTOLIC BLOOD PRESSURE: 90 MMHG | TEMPERATURE: 98.2 F | HEART RATE: 73 BPM | SYSTOLIC BLOOD PRESSURE: 142 MMHG

## 2019-12-18 DIAGNOSIS — R39.16 BENIGN PROSTATIC HYPERPLASIA (BPH) WITH STRAINING ON URINATION: ICD-10-CM

## 2019-12-18 DIAGNOSIS — G47.01 INSOMNIA DUE TO MEDICAL CONDITION: Primary | ICD-10-CM

## 2019-12-18 DIAGNOSIS — N40.1 BENIGN PROSTATIC HYPERPLASIA (BPH) WITH STRAINING ON URINATION: ICD-10-CM

## 2019-12-18 DIAGNOSIS — J30.9 CHRONIC ALLERGIC RHINITIS: ICD-10-CM

## 2019-12-18 DIAGNOSIS — I10 ESSENTIAL HYPERTENSION: ICD-10-CM

## 2019-12-18 DIAGNOSIS — M48.061 DEGENERATIVE LUMBAR SPINAL STENOSIS: ICD-10-CM

## 2019-12-18 DIAGNOSIS — M51.37 DEGENERATIVE DISC DISEASE AT L5-S1 LEVEL: ICD-10-CM

## 2019-12-18 PROBLEM — M20.20 ACQUIRED HALLUX RIGIDUS: Status: ACTIVE | Noted: 2018-08-08

## 2019-12-18 PROBLEM — R26.2 DISABILITY OF WALKING: Status: ACTIVE | Noted: 2018-08-08

## 2019-12-18 PROCEDURE — 99215 OFFICE O/P EST HI 40 MIN: CPT | Performed by: NURSE PRACTITIONER

## 2019-12-18 PROCEDURE — 93000 ELECTROCARDIOGRAM COMPLETE: CPT | Performed by: NURSE PRACTITIONER

## 2019-12-18 RX ORDER — DULOXETIN HYDROCHLORIDE 60 MG/1
60 CAPSULE, DELAYED RELEASE ORAL DAILY
Qty: 30 CAPSULE | Refills: 5 | Status: SHIPPED | OUTPATIENT
Start: 2019-12-18 | End: 2020-01-17 | Stop reason: SDUPTHER

## 2019-12-18 RX ORDER — ZOLPIDEM TARTRATE 10 MG/1
10 TABLET ORAL DAILY
COMMUNITY
End: 2019-12-18 | Stop reason: SDUPTHER

## 2019-12-18 RX ORDER — MELOXICAM 15 MG/1
15 TABLET ORAL DAILY PRN
Qty: 30 TABLET | Refills: 5 | Status: SHIPPED | OUTPATIENT
Start: 2019-12-18 | End: 2020-01-17 | Stop reason: SDUPTHER

## 2019-12-18 RX ORDER — OMEPRAZOLE 20 MG/1
20 CAPSULE, DELAYED RELEASE ORAL DAILY
Qty: 90 CAPSULE | Refills: 1 | Status: SHIPPED | OUTPATIENT
Start: 2019-12-18 | End: 2020-09-08

## 2019-12-18 RX ORDER — ZOLPIDEM TARTRATE 10 MG/1
10 TABLET ORAL DAILY
Qty: 90 TABLET | Refills: 0 | Status: SHIPPED | OUTPATIENT
Start: 2019-12-18 | End: 2020-03-17

## 2019-12-18 RX ORDER — TAMSULOSIN HYDROCHLORIDE 0.4 MG/1
0.4 CAPSULE ORAL DAILY
Qty: 90 CAPSULE | Refills: 1 | Status: CANCELLED | OUTPATIENT
Start: 2019-12-18

## 2019-12-18 RX ORDER — MONTELUKAST SODIUM 10 MG/1
10 TABLET ORAL DAILY
Qty: 90 TABLET | Refills: 3 | Status: SHIPPED | OUTPATIENT
Start: 2019-12-18 | End: 2022-10-24 | Stop reason: SDUPTHER

## 2019-12-18 RX ORDER — TAMSULOSIN HYDROCHLORIDE 0.4 MG/1
0.4 CAPSULE ORAL DAILY
Qty: 90 CAPSULE | Refills: 1 | Status: SHIPPED | OUTPATIENT
Start: 2019-12-18 | End: 2020-01-17 | Stop reason: SDUPTHER

## 2019-12-18 RX ORDER — OXYMETAZOLINE HYDROCHLORIDE 0.05 G/100ML
2 SPRAY NASAL 2 TIMES DAILY
Qty: 3 BOTTLE | Refills: 3 | Status: SHIPPED | OUTPATIENT
Start: 2019-12-18 | End: 2020-01-09 | Stop reason: SDUPTHER

## 2019-12-18 ASSESSMENT — ENCOUNTER SYMPTOMS
ABDOMINAL PAIN: 0
COUGH: 0
EYE PAIN: 0
SINUS PAIN: 0
EYE DISCHARGE: 0
CONSTIPATION: 0
SORE THROAT: 0
TROUBLE SWALLOWING: 0
BACK PAIN: 1
DIARRHEA: 0
SINUS PRESSURE: 0
CHEST TIGHTNESS: 0
SHORTNESS OF BREATH: 0

## 2020-01-09 RX ORDER — OXYMETAZOLINE HYDROCHLORIDE 0.05 G/100ML
2 SPRAY NASAL 2 TIMES DAILY
Qty: 3 BOTTLE | Refills: 3 | Status: SHIPPED | OUTPATIENT
Start: 2020-01-09 | End: 2022-10-24

## 2020-01-09 NOTE — TELEPHONE ENCOUNTER
Per pt he [facility] but [facility].  I pended a new script

## 2020-01-15 ENCOUNTER — TELEPHONE (OUTPATIENT)
Dept: PRIMARY CARE CLINIC | Age: 65
End: 2020-01-15

## 2020-01-17 RX ORDER — DULOXETIN HYDROCHLORIDE 60 MG/1
60 CAPSULE, DELAYED RELEASE ORAL DAILY
Qty: 30 CAPSULE | Refills: 5 | Status: SHIPPED | OUTPATIENT
Start: 2020-01-17 | End: 2020-09-23

## 2020-01-17 RX ORDER — LISINOPRIL 30 MG/1
30 TABLET ORAL DAILY
Qty: 30 TABLET | Refills: 5 | Status: SHIPPED
Start: 2020-01-17 | End: 2020-07-08

## 2020-01-17 RX ORDER — MELOXICAM 15 MG/1
15 TABLET ORAL DAILY PRN
Qty: 30 TABLET | Refills: 5 | Status: SHIPPED | OUTPATIENT
Start: 2020-01-17 | End: 2020-09-23

## 2020-01-17 RX ORDER — TAMSULOSIN HYDROCHLORIDE 0.4 MG/1
0.4 CAPSULE ORAL DAILY
Qty: 90 CAPSULE | Refills: 1 | Status: SHIPPED | OUTPATIENT
Start: 2020-01-17

## 2020-02-10 ENCOUNTER — TELEPHONE (OUTPATIENT)
Dept: FAMILY MEDICINE CLINIC | Age: 65
End: 2020-02-10

## 2020-02-12 ENCOUNTER — TELEPHONE (OUTPATIENT)
Dept: PRIMARY CARE CLINIC | Age: 65
End: 2020-02-12

## 2020-02-15 ENCOUNTER — OFFICE VISIT (OUTPATIENT)
Dept: FAMILY MEDICINE CLINIC | Age: 65
End: 2020-02-15
Payer: OTHER GOVERNMENT

## 2020-02-15 VITALS
SYSTOLIC BLOOD PRESSURE: 122 MMHG | RESPIRATION RATE: 16 BRPM | WEIGHT: 225 LBS | BODY MASS INDEX: 33.23 KG/M2 | OXYGEN SATURATION: 94 % | DIASTOLIC BLOOD PRESSURE: 68 MMHG | TEMPERATURE: 98.3 F | HEART RATE: 97 BPM

## 2020-02-15 PROCEDURE — 99213 OFFICE O/P EST LOW 20 MIN: CPT | Performed by: NURSE PRACTITIONER

## 2020-02-15 RX ORDER — DOXYCYCLINE HYCLATE 100 MG/1
100 CAPSULE ORAL 2 TIMES DAILY
Qty: 20 CAPSULE | Refills: 0 | Status: SHIPPED | OUTPATIENT
Start: 2020-02-15 | End: 2020-02-25

## 2020-02-15 NOTE — PROGRESS NOTES
Chief Complaint:   URI (Sinus pressure, chest congestion, sore throat, productive cough with green mucus discharge x1 week)      History of Present Illness   Source of history provided by:  patient. Jannette Gitelman is a 59 y.o. old male with a past medical history of:   Past Medical History:   Diagnosis Date    Allergic rhinitis     Chronic    BPH (benign prostatic hyperplasia)     Colonic fistula 2003    GERD (gastroesophageal reflux disease)     GERD    Lumbar disc disease     Seasonal allergies     presents to [facility] care for sore throat, which began 7 day(s) prior to arrival. The symptoms are associated with minimal productive cough. There has been NO abdominal pain, appetite decrease, chest pain, diarrhea, dizziness, earache, muscle aches, nasal congestion, nausea, rhinorrhea or vomiting. Denies any hx of asthma, COPD, or tobacco use. Review of Systems   Unless otherwise stated in this report or unable to obtain because of the patient's clinical or mental status as evidenced by the medical record, this patients's positive and negative responses for Review of Systems, constitutional, psych, eyes, ENT, cardiovascular, respiratory, gastrointestinal, neurological, genitourinary, musculoskeletal, integument systems and systems related to the presenting problem are either stated in the preceding or were not pertinent or were negative for the symptoms and/or complaints related to the medical problem. Past Surgical History:  has a past surgical history that includes Colon surgery; Abscess Drainage; and Colonoscopy (2006). Social History:  reports that he has never smoked. He has never used smokeless tobacco. He reports current alcohol use of about 2.0 standard drinks of alcohol per week. He reports that he does not use drugs. Family History: family history includes Coronary Art Dis in his mother; Dementia in his father; Other in his father and mother.    Allergies: Penicillins and Bee venom    Physical Exam   Vital Signs:  /68   Pulse 97   Temp 98.3 °F (36.8 °C) (Temporal)   Resp 16   Wt 225 lb (102.1 kg)   SpO2 94%   BMI 33.23 kg/m²    Oxygen Saturation Interpretation: Normal.    Constitutional:  Alert, development consistent with age. Ears:  External Ears: Bilateral pinna normal. TMs are without erythema or perforation bilaterally. Canals normal bilaterally without swelling or exudate  Nose:  There is mild congestion of the nasal mucosa. There is mild injection to middle turbinates bilaterally. Throat: There is posterior pharyngeal erythema with mild post nasal drip present. No exudate or tonsillar hypertrophy noted. Neck:  Supple. There is mild anterior cervical adenopathy. Lungs: CTAB without wheezes, rales, or rhonchi  Heart:  Regular rate and rhythm, normal heart sounds, without pathological murmurs, ectopy, gallops, or rubs. Skin:  Normal turgor. Warm, dry, without visible rash. Neurological:  Alert and oriented. Motor functions intact. Responds to verbal commands. Test Results Section   (All laboratory and radiology results have been personally reviewed by myself)  Labs:  No results found for this visit on 02/15/20. Imaging:  No results found. Assessment / Plan     Impression(s):  Sharla Brown was seen today for uri. Diagnoses and all orders for this visit:    Acute non-recurrent maxillary sinusitis  -     doxycycline hyclate (VIBRAMYCIN) 100 MG capsule; Take 1 capsule by mouth 2 times daily for 10 days  - Warm salt water gargle    Increase fluids and rest. Script written for Doxycycline, side effects discussed. Additional symptomatic relief discussed. PCP in 5-7 days if symptoms persist. ED sooner if symptoms worsen or change. Red flag symptoms discussed. Pt is in agreement with this care plan. All questions answered. Return if symptoms worsen or fail to improve.     Amanda Fields, APRN - CNP

## 2020-08-11 RX ORDER — ZOLPIDEM TARTRATE 10 MG/1
10 TABLET ORAL NIGHTLY PRN
Qty: 14 TABLET | Refills: 0 | Status: SHIPPED
Start: 2020-08-11 | End: 2020-08-17 | Stop reason: SDUPTHER

## 2020-08-11 RX ORDER — ZOLPIDEM TARTRATE 10 MG/1
10 TABLET ORAL NIGHTLY PRN
Qty: 30 TABLET | Refills: 0 | OUTPATIENT
Start: 2020-08-11 | End: 2020-09-10

## 2020-08-11 NOTE — TELEPHONE ENCOUNTER
I cannot e-scribe this as my IRMA is not associated with my epic, it would have to be a print out. Can you send this to another provider that can e-scribe?  thanks

## 2020-08-17 RX ORDER — ZOLPIDEM TARTRATE 10 MG/1
10 TABLET ORAL NIGHTLY PRN
Qty: 14 TABLET | Refills: 0 | Status: SHIPPED | OUTPATIENT
Start: 2020-08-17 | End: 2020-08-31

## 2020-09-08 RX ORDER — OMEPRAZOLE 20 MG/1
CAPSULE, DELAYED RELEASE ORAL
Qty: 90 CAPSULE | Refills: 3 | Status: SHIPPED | OUTPATIENT
Start: 2020-09-08

## 2020-09-23 RX ORDER — DULOXETIN HYDROCHLORIDE 60 MG/1
CAPSULE, DELAYED RELEASE ORAL
Qty: 30 CAPSULE | Refills: 0 | Status: SHIPPED
Start: 2020-09-23 | End: 2022-07-25

## 2020-09-23 RX ORDER — MELOXICAM 15 MG/1
TABLET ORAL
Qty: 30 TABLET | Refills: 0 | Status: SHIPPED
Start: 2020-09-23 | End: 2022-10-24

## 2020-09-23 NOTE — TELEPHONE ENCOUNTER
Sent 1 month supply. Patient needs to either establish with new PCP or call Jace new office for further refills. Thanks.

## 2020-09-28 RX ORDER — ZOLPIDEM TARTRATE 10 MG/1
TABLET ORAL
COMMUNITY
Start: 2020-09-22 | End: 2022-07-25

## 2022-07-25 ENCOUNTER — OFFICE VISIT (OUTPATIENT)
Dept: PRIMARY CARE CLINIC | Age: 67
End: 2022-07-25
Payer: MEDICARE

## 2022-07-25 VITALS
TEMPERATURE: 97.3 F | SYSTOLIC BLOOD PRESSURE: 138 MMHG | WEIGHT: 225 LBS | DIASTOLIC BLOOD PRESSURE: 82 MMHG | HEIGHT: 69 IN | RESPIRATION RATE: 16 BRPM | OXYGEN SATURATION: 96 % | BODY MASS INDEX: 33.33 KG/M2 | HEART RATE: 80 BPM

## 2022-07-25 DIAGNOSIS — F51.01 PRIMARY INSOMNIA: ICD-10-CM

## 2022-07-25 DIAGNOSIS — K42.9 UMBILICAL HERNIA WITHOUT OBSTRUCTION AND WITHOUT GANGRENE: ICD-10-CM

## 2022-07-25 DIAGNOSIS — N40.1 BENIGN PROSTATIC HYPERPLASIA (BPH) WITH STRAINING ON URINATION: ICD-10-CM

## 2022-07-25 DIAGNOSIS — R39.16 BENIGN PROSTATIC HYPERPLASIA (BPH) WITH STRAINING ON URINATION: ICD-10-CM

## 2022-07-25 DIAGNOSIS — R73.03 PRE-DIABETES: ICD-10-CM

## 2022-07-25 DIAGNOSIS — Z76.89 ENCOUNTER TO ESTABLISH CARE: ICD-10-CM

## 2022-07-25 DIAGNOSIS — M48.061 DEGENERATIVE LUMBAR SPINAL STENOSIS: ICD-10-CM

## 2022-07-25 DIAGNOSIS — I10 PRIMARY HYPERTENSION: Primary | ICD-10-CM

## 2022-07-25 DIAGNOSIS — K59.04 CHRONIC IDIOPATHIC CONSTIPATION: ICD-10-CM

## 2022-07-25 DIAGNOSIS — I10 PRIMARY HYPERTENSION: ICD-10-CM

## 2022-07-25 LAB
ALBUMIN SERPL-MCNC: 4.5 G/DL (ref 3.5–5.2)
ALP BLD-CCNC: 67 U/L (ref 40–129)
ALT SERPL-CCNC: 30 U/L (ref 0–40)
ANION GAP SERPL CALCULATED.3IONS-SCNC: 7 MMOL/L (ref 7–16)
AST SERPL-CCNC: 30 U/L (ref 0–39)
BASOPHILS ABSOLUTE: 0.04 E9/L (ref 0–0.2)
BASOPHILS RELATIVE PERCENT: 0.6 % (ref 0–2)
BILIRUB SERPL-MCNC: 0.6 MG/DL (ref 0–1.2)
BUN BLDV-MCNC: 17 MG/DL (ref 6–23)
CALCIUM SERPL-MCNC: 9.4 MG/DL (ref 8.6–10.2)
CHLORIDE BLD-SCNC: 103 MMOL/L (ref 98–107)
CHOLESTEROL, TOTAL: 184 MG/DL (ref 0–199)
CO2: 27 MMOL/L (ref 22–29)
CREAT SERPL-MCNC: 1.1 MG/DL (ref 0.7–1.2)
EOSINOPHILS ABSOLUTE: 0.17 E9/L (ref 0.05–0.5)
EOSINOPHILS RELATIVE PERCENT: 2.4 % (ref 0–6)
GFR AFRICAN AMERICAN: >60
GFR NON-AFRICAN AMERICAN: >60 ML/MIN/1.73
GLUCOSE BLD-MCNC: 116 MG/DL (ref 74–99)
HBA1C MFR BLD: 5.1 % (ref 4–5.6)
HCT VFR BLD CALC: 44.2 % (ref 37–54)
HDLC SERPL-MCNC: 45 MG/DL
HEMOGLOBIN: 14.8 G/DL (ref 12.5–16.5)
IMMATURE GRANULOCYTES #: 0.03 E9/L
IMMATURE GRANULOCYTES %: 0.4 % (ref 0–5)
LDL CHOLESTEROL CALCULATED: 98 MG/DL (ref 0–99)
LYMPHOCYTES ABSOLUTE: 1.61 E9/L (ref 1.5–4)
LYMPHOCYTES RELATIVE PERCENT: 22.4 % (ref 20–42)
MCH RBC QN AUTO: 32.9 PG (ref 26–35)
MCHC RBC AUTO-ENTMCNC: 33.5 % (ref 32–34.5)
MCV RBC AUTO: 98.2 FL (ref 80–99.9)
MONOCYTES ABSOLUTE: 0.63 E9/L (ref 0.1–0.95)
MONOCYTES RELATIVE PERCENT: 8.8 % (ref 2–12)
NEUTROPHILS ABSOLUTE: 4.7 E9/L (ref 1.8–7.3)
NEUTROPHILS RELATIVE PERCENT: 65.4 % (ref 43–80)
PDW BLD-RTO: 12.9 FL (ref 11.5–15)
PLATELET # BLD: 155 E9/L (ref 130–450)
PMV BLD AUTO: 11.4 FL (ref 7–12)
POTASSIUM SERPL-SCNC: 4.4 MMOL/L (ref 3.5–5)
RBC # BLD: 4.5 E12/L (ref 3.8–5.8)
SODIUM BLD-SCNC: 137 MMOL/L (ref 132–146)
T4 FREE: 1.22 NG/DL (ref 0.93–1.7)
TOTAL PROTEIN: 7.3 G/DL (ref 6.4–8.3)
TRIGL SERPL-MCNC: 207 MG/DL (ref 0–149)
TSH SERPL DL<=0.05 MIU/L-ACNC: 1.21 UIU/ML (ref 0.27–4.2)
VLDLC SERPL CALC-MCNC: 41 MG/DL
WBC # BLD: 7.2 E9/L (ref 4.5–11.5)

## 2022-07-25 PROCEDURE — 1036F TOBACCO NON-USER: CPT | Performed by: PHYSICIAN ASSISTANT

## 2022-07-25 PROCEDURE — 1123F ACP DISCUSS/DSCN MKR DOCD: CPT | Performed by: PHYSICIAN ASSISTANT

## 2022-07-25 PROCEDURE — 99204 OFFICE O/P NEW MOD 45 MIN: CPT | Performed by: PHYSICIAN ASSISTANT

## 2022-07-25 PROCEDURE — G8427 DOCREV CUR MEDS BY ELIG CLIN: HCPCS | Performed by: PHYSICIAN ASSISTANT

## 2022-07-25 PROCEDURE — G8417 CALC BMI ABV UP PARAM F/U: HCPCS | Performed by: PHYSICIAN ASSISTANT

## 2022-07-25 PROCEDURE — 3017F COLORECTAL CA SCREEN DOC REV: CPT | Performed by: PHYSICIAN ASSISTANT

## 2022-07-25 RX ORDER — ZOLPIDEM TARTRATE 10 MG/1
5 TABLET ORAL NIGHTLY PRN
Qty: 90 TABLET | Refills: 0 | Status: CANCELLED | OUTPATIENT
Start: 2022-07-25 | End: 2022-10-23

## 2022-07-25 RX ORDER — TIZANIDINE 4 MG/1
4 TABLET ORAL EVERY 8 HOURS PRN
COMMUNITY
End: 2022-10-24 | Stop reason: SDUPTHER

## 2022-07-25 RX ORDER — ZOLPIDEM TARTRATE 5 MG/1
5 TABLET ORAL NIGHTLY PRN
COMMUNITY
End: 2022-07-25 | Stop reason: SDUPTHER

## 2022-07-25 RX ORDER — ACETAMINOPHEN 160 MG
2000 TABLET,DISINTEGRATING ORAL DAILY
COMMUNITY

## 2022-07-25 RX ORDER — ASCORBIC ACID 500 MG
500 TABLET ORAL DAILY
COMMUNITY

## 2022-07-25 RX ORDER — ZOLPIDEM TARTRATE 5 MG/1
5 TABLET ORAL NIGHTLY PRN
Qty: 90 TABLET | Refills: 0 | Status: SHIPPED | OUTPATIENT
Start: 2022-07-25 | End: 2022-10-23

## 2022-07-25 RX ORDER — METHOCARBAMOL 750 MG/1
750 TABLET, FILM COATED ORAL 4 TIMES DAILY
COMMUNITY
End: 2022-10-24

## 2022-07-25 SDOH — ECONOMIC STABILITY: FOOD INSECURITY: WITHIN THE PAST 12 MONTHS, YOU WORRIED THAT YOUR FOOD WOULD RUN OUT BEFORE YOU GOT MONEY TO BUY MORE.: NEVER TRUE

## 2022-07-25 SDOH — ECONOMIC STABILITY: FOOD INSECURITY: WITHIN THE PAST 12 MONTHS, THE FOOD YOU BOUGHT JUST DIDN'T LAST AND YOU DIDN'T HAVE MONEY TO GET MORE.: NEVER TRUE

## 2022-07-25 ASSESSMENT — PATIENT HEALTH QUESTIONNAIRE - PHQ9
1. LITTLE INTEREST OR PLEASURE IN DOING THINGS: 0
SUM OF ALL RESPONSES TO PHQ QUESTIONS 1-9: 0
2. FEELING DOWN, DEPRESSED OR HOPELESS: 0
SUM OF ALL RESPONSES TO PHQ9 QUESTIONS 1 & 2: 0
SUM OF ALL RESPONSES TO PHQ QUESTIONS 1-9: 0

## 2022-07-25 ASSESSMENT — SOCIAL DETERMINANTS OF HEALTH (SDOH): HOW HARD IS IT FOR YOU TO PAY FOR THE VERY BASICS LIKE FOOD, HOUSING, MEDICAL CARE, AND HEATING?: NOT HARD AT ALL

## 2022-07-25 NOTE — PROGRESS NOTES
HPI:  The patient is a 77 y.o. male who presents today to establish care. He has a hx of BPH and has a Urolift planned in the winter with [facility]. He complains of a \"prolapse\" in his umbilicus. It \"drives him crazy. \" It is not currently painful. It is reducible. He also complains of constipation. He has 2-3 bowl mvts per day before he feels relief. He had a colonoscopy he believes when he was 61. Past Medical History:   Diagnosis Date    Allergic rhinitis     Chronic    BPH (benign prostatic hyperplasia)     Colonic fistula 2003    GERD (gastroesophageal reflux disease)     GERD    Lumbar disc disease     Seasonal allergies       Past Surgical History:   Procedure Laterality Date    ABSCESS DRAINAGE      Rectal    COLON SURGERY      COLONOSCOPY  2006      Family History   Problem Relation Age of Onset    Other Mother         during surgery at 80    Coronary Art Dis Mother     Dementia Father     Other Father         COPD     Social History     Socioeconomic History    Marital status:      Spouse name: Not on file    Number of children: Not on file    Years of education: Not on file    Highest education level: Not on file   Occupational History    Not on file   Tobacco Use    Smoking status: Never    Smokeless tobacco: Never   Vaping Use    Vaping Use: Never used   Substance and Sexual Activity    Alcohol use:  Yes     Alcohol/week: 2.0 standard drinks     Types: 2 Shots of liquor per week     Comment: Occassional    Drug use: Never    Sexual activity: Not Currently     Partners: Female   Other Topics Concern    Not on file   Social History Narrative    Not on file     Social Determinants of Health     Financial Resource Strain: Low Risk     Difficulty of Paying Living Expenses: Not hard at all   Food Insecurity: No Food Insecurity    Worried About Running Out of Food in the Last Year: Never true    Ran Out of Food in the Last Year: Never true   Transportation Needs: Not on file   Physical Activity: Not on file   Stress: Not on file   Social Connections: Not on file   Intimate Partner Violence: Not on file   Housing Stability: Not on file      Allergies   Allergen Reactions    Penicillins Hives    Bee Venom      Other reaction(s): Unknown  Other reaction(s): Unknown        Review of Systems:  Constitutional:  No fever, no fatigue, no chills, no headaches, no weight change  Dermatology:  No rash, no mole, no dry or sensitive skin  ENT:  No cough, no sore throat, no sinus pain, no runny nose, no ear pain  Cardiology:  No chest pain, no palpitations, no leg edema, no shortness of breath, no PND  Endocrinology:  No polydipsia, no polyuria, no cold intolerance, no heat intolerance, no polyphagia, no hair changes  Gastroenterology:  No dysphagia, no abdominal pain, no nausea, no vomiting, + constipation, no diarrhea, no heartburn  Male Reproductive:  No penile discharge, no dysuria  Musculoskeletal:  No joint pain, +leg cramps, no back pain, no muscle aches  Respiratory:  No shortness of breath, no orthopnea, no wheezing, no FRANK, no hemoptysis  Urology:  No blood in the urine, no urinary frequency, no urinary incontinence, no urinary urgency, no nocturia, no dysuria  Neurology:  No numbness/tingling, no dizziness, no weakness  Psychology:  No depression, no sleep disturbances, no suicidal ideation, no anxiety    Vitals:    07/25/22 1136   BP: 138/82   Pulse: 80   Resp: 16   Temp: 97.3 °F (36.3 °C)   SpO2: 96%   Weight: 225 lb (102.1 kg)   Height: 5' 9\" (1.753 m)       Physical Exam  Constitutional:       General: He is not in acute distress. Appearance: Normal appearance. He is well-developed. He is obese. HENT:      Head: Normocephalic and atraumatic. Right Ear: External ear normal.      Left Ear: External ear normal.      Nose: Nose normal.   Eyes:      General: No scleral icterus. Conjunctiva/sclera: Conjunctivae normal.      Pupils: Pupils are equal, round, and reactive to light.    Neck: Thyroid: No thyromegaly. Cardiovascular:      Rate and Rhythm: Normal rate and regular rhythm. Heart sounds: Normal heart sounds. No murmur heard. Pulmonary:      Effort: Pulmonary effort is normal. No accessory muscle usage or respiratory distress. Breath sounds: Normal breath sounds. No wheezing. Abdominal:      General: Abdomen is protuberant. Palpations: Abdomen is soft. Tenderness: There is no abdominal tenderness. Hernia: A hernia is present. Hernia is present in the umbilical area. Musculoskeletal:         General: Normal range of motion. Cervical back: Normal range of motion and neck supple. Skin:     General: Skin is warm and dry. Findings: No rash. Neurological:      Mental Status: He is alert and oriented to person, place, and time. Deep Tendon Reflexes: Reflexes are normal and symmetric. Psychiatric:         Mood and Affect: Mood and affect normal.         Speech: Speech normal.         Behavior: Behavior normal.       Assessment/Plan:      Ashley Durán was seen today for established new doctor. Diagnoses and all orders for this visit:    Primary hypertension  -     Comprehensive Metabolic Panel; Future  -     LIPID PANEL; Future  -     CBC with Auto Differential; Future  -     TSH; Future  -     T4, Free; Future  -stable on acei, continue    Benign prostatic hyperplasia (BPH) with straining on urination  -seeing [facility] u[facility]  Degenerative lumbar spinal stenosis    Encounter to establish care    Pre-diabetes  -     Hemoglobin W9A; Future    Umbilical hernia without obstruction and without gangrene  -     Andrae Isaac MD, General Surgery, [facility]    Chronic idiopathic constipation  -     TSH; Future  -     T4, Free; Future    Primary insomnia  -     zolpidem (AMBIEN) 5 MG tablet; Take 1 tablet by mouth nightly as needed for Sleep for up to 90 days.     Controlled Substance Monitoring:    Acute and Chronic Pain Monitoring:   RX Monitoring 7/25/2022 Periodic Controlled Substance Monitoring No signs of potential drug abuse or diversion identified. As above. Call or go to ED immediately if symptoms worsen or persist.  Return in about 3 months (around 10/25/2022). , or sooner if necessary. Educational materials and/or home exercises printed for patient's review and were included in patient instructions on his/her After Visit Summary and given to patient at the end of visit. Counseled regarding above diagnosis, including possible risks and complications,  especially if left uncontrolled. Counseled regarding the possible side effects, risks, benefits and alternatives to treatment; patient and/or guardian verbalizes understanding, agrees, feels comfortable with and wishes to proceed with above treatment plan. Advised patient to call with any new medication issues, and read all Rx info from pharmacy to assure aware of all possible risks and side effects of medication before taking. Reviewed age and gender appropriate health screening exams and vaccinations. Advised patient regarding importance of keeping up with recommended health maintenance and to schedule as soon as possible if overdue, as this is important in assessing for undiagnosed pathology, especially cancer, as well as protecting against potentially harmful/life threatening disease. Patient and/or guardian verbalizes understanding and agrees with above counseling, assessment and plan. All questions answered. Dipesh Cary PA-C  7/25/2022    I have personally reviewed and updated the chief complaint, HPI, Past Medical, Family and Social History, as well as the above Review of Systems.

## 2022-08-17 ENCOUNTER — OFFICE VISIT (OUTPATIENT)
Dept: SURGERY | Age: 67
End: 2022-08-17
Payer: MEDICARE

## 2022-08-17 VITALS
DIASTOLIC BLOOD PRESSURE: 95 MMHG | RESPIRATION RATE: 18 BRPM | WEIGHT: 224.2 LBS | TEMPERATURE: 97.7 F | BODY MASS INDEX: 33.21 KG/M2 | SYSTOLIC BLOOD PRESSURE: 146 MMHG | HEIGHT: 69 IN | HEART RATE: 77 BPM | OXYGEN SATURATION: 98 %

## 2022-08-17 DIAGNOSIS — K42.0 UMBILICAL HERNIA WITH OBSTRUCTION, WITHOUT GANGRENE: Primary | ICD-10-CM

## 2022-08-17 PROCEDURE — G8427 DOCREV CUR MEDS BY ELIG CLIN: HCPCS | Performed by: SURGERY

## 2022-08-17 PROCEDURE — 99203 OFFICE O/P NEW LOW 30 MIN: CPT | Performed by: SURGERY

## 2022-08-17 PROCEDURE — 3017F COLORECTAL CA SCREEN DOC REV: CPT | Performed by: SURGERY

## 2022-08-17 PROCEDURE — 1036F TOBACCO NON-USER: CPT | Performed by: SURGERY

## 2022-08-17 PROCEDURE — 1123F ACP DISCUSS/DSCN MKR DOCD: CPT | Performed by: SURGERY

## 2022-08-17 PROCEDURE — G8417 CALC BMI ABV UP PARAM F/U: HCPCS | Performed by: SURGERY

## 2022-08-17 NOTE — PROGRESS NOTES
Partner Violence: Not on file   Housing Stability: Not on file       Current Outpatient Medications   Medication Sig Dispense Refill    methocarbamol (ROBAXIN) 750 MG tablet Take 750 mg by mouth in the morning and 750 mg at noon and 750 mg in the evening and 750 mg before bedtime. tiZANidine (ZANAFLEX) 4 MG tablet Take 4 mg by mouth every 8 hours as needed      Cholecalciferol (VITAMIN D3) 50 MCG (2000 UT) CAPS Take 2,000 Units by mouth in the morning. vitamin C (ASCORBIC ACID) 500 MG tablet Take 500 mg by mouth in the morning. zolpidem (AMBIEN) 5 MG tablet Take 1 tablet by mouth nightly as needed for Sleep for up to 90 days. 90 tablet 0    meloxicam (MOBIC) 15 MG tablet TAKE 1 TABLET DAILY AS NEEDED FOR PAIN 30 tablet 0    omeprazole (PRILOSEC) 20 MG delayed release capsule TAKE 1 CAPSULE DAILY 90 capsule 3    lisinopril (PRINIVIL;ZESTRIL) 30 MG tablet TAKE 1 TABLET DAILY 90 tablet 0    tamsulosin (FLOMAX) 0.4 MG capsule Take 1 capsule by mouth daily 90 capsule 1    oxymetazoline (QC NASAL RELIEF MOISTURIZING) 0.05 % nasal spray 2 sprays by Nasal route 2 times daily 3 Bottle 3    montelukast (SINGULAIR) 10 MG tablet Take 1 tablet by mouth daily 90 tablet 3    Cyanocobalamin (B-12 PO) Take 1 tablet by mouth daily      LUTEIN PO Take 1 tablet by mouth daily      Omega-3 Fatty Acids (OMEGA-3 FISH OIL PO) Take 1 tablet by mouth daily      Multiple Vitamins-Minerals (MULTIVITAMIN PO) Take 1 tablet by mouth daily      MAGNESIUM OXIDE PO Take by mouth      aspirin 81 MG tablet Take 81 mg by mouth daily      Saw Palmetto, Serenoa repens, (SAW PALMETTO EXTRACT PO) . qd      Potassium 75 MG TABS       EPINEPHrine (EPIPEN) 0.3 MG/0.3ML SOAJ injection       Calcium Ascorbate 500 MG TABS . bid       No current facility-administered medications for this visit.        Allergies   Allergen Reactions    Penicillins Hives    Bee Venom      Other reaction(s): Unknown  Other reaction(s): Unknown       REVIEW OF SYSTEMS: Constitutional: negative   Eyes: negative  Ears, nose, mouth, throat, and face: negative  Respiratory: negative  Cardiovascular: negative  Gastrointestinal: negative  Genitourinary:see hpi   Integument/breast: negative  Hematologic/lymphatic: negative  Musculoskeletal:negative  Neurological: negative  Allergic/Immunologic: negative    Physical Exam:    @BP (!) 146/95   Pulse 77   Temp 97.7 °F (36.5 °C) (Temporal)   Resp 18   Ht 5' 9\" (1.753 m)   Wt 224 lb 3.2 oz (101.7 kg)   SpO2 98%   BMI 33.11 kg/m² @    GENERAL EXAM: On exam- pt appears stated age. No acute distress. NEURO:  Alert and oriented x 3. No obvious neuro deficits   HEENT: head- atraumatic-normocephalic. No discharge from ears, nose or throat. NECK: Supple. No jugular venous distention. CVS:RR. LUNGS: Bilateral chest movements without the use of accessory muscles. Respirations easy, nonlabored,   ABDOMEN: Abdomen soft, nondistended, nontender, umbilical mass reducible, about a 1-2 cm defect on exam   RECTAL: exam deferred. EXTREMITIES: No edema, NVI and symmetrical        IMPRESSION/PLAN: This is a 77 y.o. male who has an umbilical hernia    I recommended lap umbilical hernia repair with mesh and I explained therisk, benefits, expected outcome, and alternatives to the procedure. Risks included but are not limited to bleeding, infection, mesh infection, enteric fistula,. June Figueroa understands and is in agreement.     Hold ASA 7 days prior to surgery      Electronically Signed by Khurram Menjivar MD FACS   2:42 PM

## 2022-08-19 ENCOUNTER — TELEPHONE (OUTPATIENT)
Dept: SURGERY | Age: 67
End: 2022-08-19

## 2022-08-19 NOTE — TELEPHONE ENCOUNTER
Scheduled patient for lap umbilical hernia repair with mesh on 11/4/22 at 10 am in [facility]. Patient needs to report at the front entrance 2 hours before the procedure, NPO after the midnight the night before the procedure. No ASA products for 7 days. Patient verbalized understanding. instruction letter mailed. Encouraged to call our office if any questions.   Electronically signed by Ken Rahman on 8/19/2022 at 3:38 PM

## 2022-08-19 NOTE — TELEPHONE ENCOUNTER
Prior Authorization Form:      DEMOGRAPHICS:                     Patient Name:  Avery Singleton  Patient :  1955            Insurance:  Payor: MEDICARE / Plan: MEDICARE PART A AND B / Product Type: *No Product type* /   Insurance ID Number:    Payer/Plan Subscr  Sex Relation Sub. Ins. ID Effective Group Num   1. [facility]IC[facility] - [facility] 1955 Male Self [id number]                                    [address] [address]   2.  [facility] 1955 Male Self [id number]                                    [address]         DIAGNOSIS & PROCEDURE:                       Procedure/Operation: lap umbilical hernia repair with mesh           CPT Code: 87963    Diagnosis:  umbilical hernia    YRA50 Code: K42.9    Location:  [place]     Surgeon:  Dr. Margie Lama INFORMATION:                          Date: 22    Time: 10am              Anesthesia:  MAC/TIVA                                                       Status:  Outpatient        Special Comments:  N/A       Electronically signed by Tramaine Landry on 2022 at 3:02 PM

## 2022-09-17 ENCOUNTER — OFFICE VISIT (OUTPATIENT)
Dept: FAMILY MEDICINE CLINIC | Age: 67
End: 2022-09-17
Payer: MEDICARE

## 2022-09-17 VITALS
SYSTOLIC BLOOD PRESSURE: 130 MMHG | DIASTOLIC BLOOD PRESSURE: 88 MMHG | OXYGEN SATURATION: 97 % | BODY MASS INDEX: 32.88 KG/M2 | WEIGHT: 222 LBS | HEART RATE: 84 BPM | TEMPERATURE: 98.6 F | RESPIRATION RATE: 20 BRPM | HEIGHT: 69 IN

## 2022-09-17 DIAGNOSIS — L50.9 URTICARIA: Primary | ICD-10-CM

## 2022-09-17 PROCEDURE — G8427 DOCREV CUR MEDS BY ELIG CLIN: HCPCS | Performed by: FAMILY MEDICINE

## 2022-09-17 PROCEDURE — 3017F COLORECTAL CA SCREEN DOC REV: CPT | Performed by: FAMILY MEDICINE

## 2022-09-17 PROCEDURE — G8417 CALC BMI ABV UP PARAM F/U: HCPCS | Performed by: FAMILY MEDICINE

## 2022-09-17 PROCEDURE — 1123F ACP DISCUSS/DSCN MKR DOCD: CPT | Performed by: FAMILY MEDICINE

## 2022-09-17 PROCEDURE — 99213 OFFICE O/P EST LOW 20 MIN: CPT | Performed by: FAMILY MEDICINE

## 2022-09-17 PROCEDURE — 1036F TOBACCO NON-USER: CPT | Performed by: FAMILY MEDICINE

## 2022-09-17 RX ORDER — PREDNISONE 10 MG/1
TABLET ORAL
Qty: 30 TABLET | Refills: 0 | Status: SHIPPED
Start: 2022-09-17 | End: 2022-10-24

## 2022-09-17 ASSESSMENT — ENCOUNTER SYMPTOMS
GASTROINTESTINAL NEGATIVE: 1
EYES NEGATIVE: 1
ALLERGIC/IMMUNOLOGIC NEGATIVE: 1
RESPIRATORY NEGATIVE: 1

## 2022-09-17 NOTE — PROGRESS NOTES
22     Kate Padilla    : 1955 Sex: male   Age: 77 y.o. Chief Complaint   Patient presents with    Rash     Nothing new eat or lotions,soaps, detergents since wednesday       Prior to Admission medications    Medication Sig Start Date End Date Taking? Authorizing Provider   predniSONE (DELTASONE) 10 MG tablet 3 qd x 5days then 2 qd x5days then 1 qd x5days 22  Yes Christos Rogers DO   methocarbamol (ROBAXIN) 750 MG tablet Take 750 mg by mouth in the morning and 750 mg at noon and 750 mg in the evening and 750 mg before bedtime. Yes Historical Provider, MD   tiZANidine (ZANAFLEX) 4 MG tablet Take 4 mg by mouth every 8 hours as needed   Yes Historical Provider, MD   Cholecalciferol (VITAMIN D3) 50 MCG ( UT) CAPS Take 2,000 Units by mouth in the morning. Yes Historical Provider, MD   vitamin C (ASCORBIC ACID) 500 MG tablet Take 500 mg by mouth in the morning. Yes Historical Provider, MD   zolpidem (AMBIEN) 5 MG tablet Take 1 tablet by mouth nightly as needed for Sleep for up to 90 days.  7/25/22 10/23/22 Yes Geo Allen PA-C   meloxicam (MOBIC) 15 MG tablet TAKE 1 TABLET DAILY AS NEEDED FOR PAIN 20  Yes JACQUELINE Madden CNP   omeprazole (PRILOSEC) 20 MG delayed release capsule TAKE 1 CAPSULE DAILY 20  Yes Baljit Hurtado PA-C   lisinopril (PRINIVIL;ZESTRIL) 30 MG tablet TAKE 1 TABLET DAILY 20  Yes JACQUELINE Madden CNP   tamsulosin (FLOMAX) 0.4 MG capsule Take 1 capsule by mouth daily 20  Yes JACQUELINE Reaves CNP   oxymetazoline (QC NASAL RELIEF MOISTURIZING) 0.05 % nasal spray 2 sprays by Nasal route 2 times daily 20  Yes JACQUELINE Reaves CNP   montelukast (SINGULAIR) 10 MG tablet Take 1 tablet by mouth daily 19  Yes JACQUELINE Reaves CNP   Cyanocobalamin (B-12 PO) Take 1 tablet by mouth daily   Yes Historical Provider, MD   LUTEIN PO Take 1 tablet by mouth daily   Yes Historical Provider, MD   Omega-3 Fatty Acids (OMEGA-3 FISH OIL PO) Take 1 tablet by mouth daily   Yes Historical Provider, MD   Multiple Vitamins-Minerals (MULTIVITAMIN PO) Take 1 tablet by mouth daily   Yes Historical Provider, MD   MAGNESIUM OXIDE PO Take by mouth   Yes Historical Provider, MD   aspirin 81 MG tablet Take 81 mg by mouth daily   Yes Historical Provider, MD Juan Luis Delgado Serenoa repens, (SAW PALMETTO EXTRACT PO) . qd 6/17/03  Yes Historical Provider, MD   Potassium 75 MG TABS    Yes Historical Provider, MD   EPINEPHrine (EPIPEN) 0.3 MG/0.3ML SOAJ injection  6/27/18  Yes Historical Provider, MD   Calcium Ascorbate 500 MG TABS . bid 6/17/03  Yes Historical Provider, MD          HPI: Patient is seen today problems with diffuse rash extremities abdomen back buttocks all consistent with urticarial rash. Etiology uncertain but we are going to treat with some Benadryl and prednisone and then if persistent follow-up with primary medical doctor. Anticipate improvement. Clinically otherwise stable. Review of Systems   Constitutional: Negative. HENT: Negative. Eyes: Negative. Respiratory: Negative. Gastrointestinal: Negative. Endocrine: Negative. Genitourinary: Negative. Musculoskeletal: Negative. Skin: Negative. Allergic/Immunologic: Negative. Neurological: Negative. Hematological: Negative. Psychiatric/Behavioral: Negative. Today systems review stable medications as prescribed. Current Outpatient Medications:     predniSONE (DELTASONE) 10 MG tablet, 3 qd x 5days then 2 qd x5days then 1 qd x5days, Disp: 30 tablet, Rfl: 0    methocarbamol (ROBAXIN) 750 MG tablet, Take 750 mg by mouth in the morning and 750 mg at noon and 750 mg in the evening and 750 mg before bedtime. , Disp: , Rfl:     tiZANidine (ZANAFLEX) 4 MG tablet, Take 4 mg by mouth every 8 hours as needed, Disp: , Rfl:     Cholecalciferol (VITAMIN D3) 50 MCG (2000 UT) CAPS, Take 2,000 Units by mouth in the morning., Disp: , Rfl: vitamin C (ASCORBIC ACID) 500 MG tablet, Take 500 mg by mouth in the morning., Disp: , Rfl:     zolpidem (AMBIEN) 5 MG tablet, Take 1 tablet by mouth nightly as needed for Sleep for up to 90 days. , Disp: 90 tablet, Rfl: 0    meloxicam (MOBIC) 15 MG tablet, TAKE 1 TABLET DAILY AS NEEDED FOR PAIN, Disp: 30 tablet, Rfl: 0    omeprazole (PRILOSEC) 20 MG delayed release capsule, TAKE 1 CAPSULE DAILY, Disp: 90 capsule, Rfl: 3    lisinopril (PRINIVIL;ZESTRIL) 30 MG tablet, TAKE 1 TABLET DAILY, Disp: 90 tablet, Rfl: 0    tamsulosin (FLOMAX) 0.4 MG capsule, Take 1 capsule by mouth daily, Disp: 90 capsule, Rfl: 1    oxymetazoline (QC NASAL RELIEF MOISTURIZING) 0.05 % nasal spray, 2 sprays by Nasal route 2 times daily, Disp: 3 Bottle, Rfl: 3    montelukast (SINGULAIR) 10 MG tablet, Take 1 tablet by mouth daily, Disp: 90 tablet, Rfl: 3    Cyanocobalamin (B-12 PO), Take 1 tablet by mouth daily, Disp: , Rfl:     LUTEIN PO, Take 1 tablet by mouth daily, Disp: , Rfl:     Omega-3 Fatty Acids (OMEGA-3 FISH OIL PO), Take 1 tablet by mouth daily, Disp: , Rfl:     Multiple Vitamins-Minerals (MULTIVITAMIN PO), Take 1 tablet by mouth daily, Disp: , Rfl:     MAGNESIUM OXIDE PO, Take by mouth, Disp: , Rfl:     aspirin 81 MG tablet, Take 81 mg by mouth daily, Disp: , Rfl:     Saw Palmetto, Serenoa repens, (SAW PALMETTO EXTRACT PO), .qd, Disp: , Rfl:     Potassium 75 MG TABS, , Disp: , Rfl:     EPINEPHrine (EPIPEN) 0.3 MG/0.3ML SOAJ injection, , Disp: , Rfl:     Calcium Ascorbate 500 MG TABS, .bid, Disp: , Rfl:     Allergies   Allergen Reactions    Penicillins Hives    Bee Venom      Other reaction(s): Unknown  Other reaction(s): Unknown       Social History     Tobacco Use    Smoking status: Never    Smokeless tobacco: Never   Vaping Use    Vaping Use: Never used   Substance Use Topics    Alcohol use:  Yes     Alcohol/week: 2.0 standard drinks     Types: 2 Shots of liquor per week     Comment: Occassional    Drug use: Never      Past Surgical History:   Procedure Laterality Date    ABSCESS DRAINAGE      Rectal    COLON SURGERY      COLONOSCOPY  2006     Family History   Problem Relation Age of Onset    Other Mother         during surgery at 80    Coronary Art Dis Mother     Dementia Father     Other Father         COPD     Past Medical History:   Diagnosis Date    Allergic rhinitis     Chronic    BPH (benign prostatic hyperplasia)     Colonic fistula 2003    GERD (gastroesophageal reflux disease)     GERD    Lumbar disc disease     Seasonal allergies        Vitals:    09/17/22 0841   BP: 130/88   Pulse: 84   Resp: 20   Temp: 98.6 °F (37 °C)   TempSrc: Temporal   SpO2: 97%   Weight: 222 lb (100.7 kg)   Height: 5' 9\" (1.753 m)     BP Readings from Last 3 Encounters:   09/17/22 130/88   08/17/22 (!) 146/95   07/25/22 138/82        Physical Exam  Vitals and nursing note reviewed. Constitutional:       Appearance: He is well-developed. HENT:      Head: Normocephalic. Right Ear: External ear normal.      Left Ear: External ear normal.      Nose: Nose normal.   Eyes:      Conjunctiva/sclera: Conjunctivae normal.      Pupils: Pupils are equal, round, and reactive to light. Cardiovascular:      Rate and Rhythm: Normal rate. Pulmonary:      Breath sounds: Normal breath sounds. Abdominal:      General: Bowel sounds are normal.      Palpations: Abdomen is soft. Musculoskeletal:         General: Normal range of motion. Cervical back: Normal range of motion and neck supple. Skin:     General: Skin is warm and dry. Neurological:      Mental Status: He is alert and oriented to person, place, and time. Psychiatric:         Behavior: Behavior normal.   Today's vitals physical exam stable. Rash as noted. Treatment as noted and then follow-up with primary medical doctor. Plan Per Assessment:  Hetal Hester was seen today for rash.     Diagnoses and all orders for this visit:    Urticaria    Other orders  -     predniSONE (DELTASONE) 10 MG tablet; 3 qd x 5days then 2 qd x5days then 1 qd x5days          No follow-ups on file. Jimbo Barrow DO    Note was generated with the assistance of voice recognition software. Document was reviewed however may contain grammatical errors.

## 2022-10-24 ENCOUNTER — TELEPHONE (OUTPATIENT)
Dept: PRIMARY CARE CLINIC | Age: 67
End: 2022-10-24

## 2022-10-24 ENCOUNTER — OFFICE VISIT (OUTPATIENT)
Dept: PRIMARY CARE CLINIC | Age: 67
End: 2022-10-24
Payer: MEDICARE

## 2022-10-24 VITALS
HEART RATE: 94 BPM | HEIGHT: 69 IN | BODY MASS INDEX: 33.47 KG/M2 | DIASTOLIC BLOOD PRESSURE: 84 MMHG | OXYGEN SATURATION: 95 % | SYSTOLIC BLOOD PRESSURE: 130 MMHG | RESPIRATION RATE: 16 BRPM | TEMPERATURE: 97.7 F | WEIGHT: 226 LBS

## 2022-10-24 DIAGNOSIS — J30.2 SEASONAL ALLERGIES: ICD-10-CM

## 2022-10-24 DIAGNOSIS — R39.16 BENIGN PROSTATIC HYPERPLASIA (BPH) WITH STRAINING ON URINATION: ICD-10-CM

## 2022-10-24 DIAGNOSIS — M48.061 DEGENERATIVE LUMBAR SPINAL STENOSIS: ICD-10-CM

## 2022-10-24 DIAGNOSIS — I10 PRIMARY HYPERTENSION: ICD-10-CM

## 2022-10-24 DIAGNOSIS — Z91.030 BEE STING ALLERGY: ICD-10-CM

## 2022-10-24 DIAGNOSIS — N40.1 BENIGN PROSTATIC HYPERPLASIA (BPH) WITH STRAINING ON URINATION: ICD-10-CM

## 2022-10-24 DIAGNOSIS — Z00.00 WELCOME TO MEDICARE PREVENTIVE VISIT: Primary | ICD-10-CM

## 2022-10-24 PROCEDURE — 1123F ACP DISCUSS/DSCN MKR DOCD: CPT | Performed by: PHYSICIAN ASSISTANT

## 2022-10-24 PROCEDURE — G0402 INITIAL PREVENTIVE EXAM: HCPCS | Performed by: PHYSICIAN ASSISTANT

## 2022-10-24 RX ORDER — LISINOPRIL 30 MG/1
TABLET ORAL
Qty: 90 TABLET | Refills: 3 | Status: SHIPPED | OUTPATIENT
Start: 2022-10-24

## 2022-10-24 RX ORDER — EPINEPHRINE 0.3 MG/.3ML
0.3 INJECTION SUBCUTANEOUS ONCE
Qty: 2 EACH | Refills: 3 | Status: SHIPPED | OUTPATIENT
Start: 2022-10-24 | End: 2022-10-24

## 2022-10-24 RX ORDER — MONTELUKAST SODIUM 10 MG/1
10 TABLET ORAL DAILY
Qty: 90 TABLET | Refills: 3 | Status: SHIPPED | OUTPATIENT
Start: 2022-10-24

## 2022-10-24 RX ORDER — OXYMETAZOLINE HYDROCHLORIDE 0.05 G/100ML
2 SPRAY NASAL 2 TIMES DAILY
Qty: 3 EACH | Refills: 3 | Status: SHIPPED
Start: 2022-10-24 | End: 2022-11-15

## 2022-10-24 RX ORDER — TIZANIDINE 4 MG/1
4 TABLET ORAL EVERY 8 HOURS PRN
Qty: 30 TABLET | Refills: 3 | Status: SHIPPED
Start: 2022-10-24 | End: 2022-11-01

## 2022-10-24 ASSESSMENT — PATIENT HEALTH QUESTIONNAIRE - PHQ9
SUM OF ALL RESPONSES TO PHQ QUESTIONS 1-9: 0
SUM OF ALL RESPONSES TO PHQ9 QUESTIONS 1 & 2: 0
SUM OF ALL RESPONSES TO PHQ QUESTIONS 1-9: 0
1. LITTLE INTEREST OR PLEASURE IN DOING THINGS: 0
2. FEELING DOWN, DEPRESSED OR HOPELESS: 0
SUM OF ALL RESPONSES TO PHQ QUESTIONS 1-9: 0
SUM OF ALL RESPONSES TO PHQ QUESTIONS 1-9: 0

## 2022-10-24 ASSESSMENT — LIFESTYLE VARIABLES
HOW OFTEN DO YOU HAVE A DRINK CONTAINING ALCOHOL: 2-4 TIMES A MONTH
HOW MANY STANDARD DRINKS CONTAINING ALCOHOL DO YOU HAVE ON A TYPICAL DAY: 1 OR 2

## 2022-10-24 NOTE — TELEPHONE ENCOUNTER
----- Message from Joshua Thorne sent at 10/24/2022  1:44 PM EDT -----  Subject: Message to Provider    QUESTIONS  Information for Provider? Nik Naik wanted to know the name of the Cardiologist   who placed the monitor on Kel Whittington and Santiagool Whittington was calling back to let her know   that that 's name is Avtar Certain and she can request the results   from him.   ---------------------------------------------------------------------------  --------------  [id number]  [phone]; OK to leave message on voicemail  ---------------------------------------------------------------------------  --------------  SCRIPT ANSWERS  Relationship to Patient?  Self

## 2022-10-24 NOTE — PROGRESS NOTES
Medicare Annual Wellness Visit    Malena Moore is here for Medicare AWV (3 months)    Assessment & Plan   Welcome to Medicare preventive visit  Benign prostatic hyperplasia (BPH) with straining on urination  Degenerative lumbar spinal stenosis  -     tiZANidine (ZANAFLEX) 4 MG tablet; Take 1 tablet by mouth every 8 hours as needed (pain), Disp-30 tablet, R-3Normal  Seasonal allergies  -     montelukast (SINGULAIR) 10 MG tablet; Take 1 tablet by mouth daily, Disp-90 tablet, R-3Normal  -     oxymetazoline (QC NASAL RELIEF MOISTURIZING) 0.05 % nasal spray; 2 sprays by Nasal route 2 times daily, Disp-3 each, R-3Normal  Primary hypertension  -     lisinopril (PRINIVIL;ZESTRIL) 30 MG tablet; TAKE 1 TABLET DAILY, Disp-90 tablet, R-3Normal  Bee sting allergy  -     EPINEPHrine (EPIPEN) 0.3 MG/0.3ML SOAJ injection; Inject 0.3 mLs into the muscle once for 1 dose, Disp-2 each, R-3Normal    Recommendations for Preventive Services Due: see orders and patient instructions/AVS.  Recommended screening schedule for the next 5-10 years is provided to the patient in written form: see Patient Instructions/AVS.     Return in 6 months (on 4/24/2023) for Medicare Annual Wellness Visit in 1 year, htn. Subjective   The following acute and/or chronic problems were also addressed today:  Hernia surgery scheduled 11/4/22    Patient's complete Health Risk Assessment and screening values have been reviewed and are found in Flowsheets. The following problems were reviewed today and where indicated follow up appointments were made and/or referrals ordered.     Positive Risk Factor Screenings with Interventions:             General Health and ACP:  General  In general, how would you say your health is?: Very Good  In the past 7 days, have you experienced any of the following: New or Increased Pain, New or Increased Fatigue, Loneliness, Social Isolation, Stress or Anger?: No  Do you get the social and emotional support that you need?: Yes  Do you have a Living Will?: Yes    Advance Directives       Power of Lise Zapata Will ACP-Advance Directive ACP-Power of     Not on File Not on File Not on File Not on File        General Health Risk Interventions:   Living Will: completed      Health Habits/Nutrition:  Physical Activity: Sufficiently Active    Days of Exercise per Week: 3 days    Minutes of Exercise per Session: 60 min     Have you lost any weight without trying in the past 3 months?: No  Body mass index: (!) 33.37  Have you seen the dentist within the past year?: Yes  Health Habits/Nutrition Interventions:  appropriate  Goes to gym, plays tennis, aerobic machines 3 x week    Hearing/Vision:  Do you or your family notice any trouble with your hearing that hasn't been managed with hearing aids?: (!) Yes  Do you have difficulty driving, watching TV, or doing any of your daily activities because of your eyesight?: No  Have you had an eye exam within the past year?: (!) No  No results found. Hearing/Vision Interventions:  Hearing problems, has forms for hearing aids through [place]            Objective   Vitals:    10/24/22 1211   BP: 130/84   Pulse: 94   Resp: 16   Temp: 97.7 °F (36.5 °C)   SpO2: 95%   Weight: 226 lb (102.5 kg)   Height: 5' 9\" (1.753 m)      Body mass index is 33.37 kg/m².       General Appearance: alert and oriented to person, place and time, well developed and well- nourished, in no acute distress  Skin: warm and dry, no rash or erythema  Head: normocephalic and atraumatic  Eyes: pupils equal, round, and reactive to light, extraocular eye movements intact, conjunctivae normal  ENT: tympanic membrane, external ear and ear canal normal bilaterally, nose without deformity, nasal mucosa and turbinates normal without polyps  Neck: supple and non-tender without mass, no thyromegaly or thyroid nodules, no cervical lymphadenopathy  Pulmonary/Chest: clear to auscultation bilaterally- no wheezes, rales or rhonchi, normal air movement, no respiratory distress  Cardiovascular: normal rate, regular rhythm, normal S1 and S2, no murmurs, rubs, clicks, or gallops, distal pulses intact, no carotid bruits  Abdomen: soft, non-tender, non-distended, normal bowel sounds, no masses or organomegaly  Extremities: no cyanosis, clubbing or edema  Musculoskeletal: normal range of motion, no joint swelling, deformity or tenderness  Neurologic: reflexes normal and symmetric, no cranial nerve deficit, gait, coordination and speech normal       Allergies   Allergen Reactions    Penicillins Hives    Bee Venom      Other reaction(s): Unknown  Other reaction(s): Unknown     Prior to Visit Medications    Medication Sig Taking? Authorizing Provider   lisinopril (PRINIVIL;ZESTRIL) 30 MG tablet TAKE 1 TABLET DAILY Yes Suyapa Estrada PA-C   montelukast (SINGULAIR) 10 MG tablet Take 1 tablet by mouth daily Yes Suyapa Estrada PA-C   tiZANidine (ZANAFLEX) 4 MG tablet Take 1 tablet by mouth every 8 hours as needed (pain) Yes Suyapa Estrada PA-C   oxymetazoline (QC NASAL RELIEF MOISTURIZING) 0.05 % nasal spray 2 sprays by Nasal route 2 times daily Yes Suyapa Estrada PA-C   EPINEPHrine (EPIPEN) 0.3 MG/0.3ML SOAJ injection Inject 0.3 mLs into the muscle once for 1 dose Yes Suyapa Estrada PA-C   Cholecalciferol (VITAMIN D3) 50 MCG (2000 UT) CAPS Take 2,000 Units by mouth in the morning. Yes Historical Provider, MD   vitamin C (ASCORBIC ACID) 500 MG tablet Take 500 mg by mouth in the morning.  Yes Historical Provider, MD   omeprazole (PRILOSEC) 20 MG delayed release capsule TAKE 1 CAPSULE DAILY Yes Wilman Saunders PA-C   tamsulosin (FLOMAX) 0.4 MG capsule Take 1 capsule by mouth daily Yes Fransisco Mooring, APRN - CNP   Cyanocobalamin (B-12 PO) Take 1 tablet by mouth daily Yes Historical Provider, MD   LUTEIN PO Take 1 tablet by mouth daily Yes Historical Provider, MD   Omega-3 Fatty Acids (OMEGA-3 FISH OIL PO) Take 1 tablet by mouth daily Yes Historical Provider, MD   Multiple Vitamins-Minerals (MULTIVITAMIN PO) Take 1 tablet by mouth daily Yes Historical Provider, MD   MAGNESIUM OXIDE PO Take by mouth Yes Historical Provider, MD   aspirin 81 MG tablet Take 81 mg by mouth daily Yes Historical Provider, Kim Ba, (SAW PALMETTO EXTRACT PO) . qd Yes Historical Provider, MD   Potassium 75 MG TABS  Yes Historical Provider, MD   Calcium Ascorbate 500 MG TABS . bid Yes Historical Provider, MD       CareTeam (Including outside providers/suppliers regularly involved in providing care):   Patient Care Team:  Gregorio Nowak PA-C as PCP - General (Family Medicine)  Gregorio Nowak PA-C as PCP - [facility] Empaneled Provider     Reviewed and updated this visit:  Tobacco  Allergies  Meds  Problems  Med Hx  Surg Hx  Soc Hx  Fam Hx

## 2022-10-24 NOTE — PATIENT INSTRUCTIONS
Personalized Preventive Plan for Dez Hill - 10/24/2022  Medicare offers a range of preventive health benefits. Some of the tests and screenings are paid in full while other may be subject to a deductible, co-insurance, and/or copay. Some of these benefits include a comprehensive review of your medical history including lifestyle, illnesses that may run in your family, and various assessments and screenings as appropriate. After reviewing your medical record and screening and assessments performed today your provider may have ordered immunizations, labs, imaging, and/or referrals for you. A list of these orders (if applicable) as well as your Preventive Care list are included within your After Visit Summary for your review. Other Preventive Recommendations:    A preventive eye exam performed by an eye specialist is recommended every 1-2 years to screen for glaucoma; cataracts, macular degeneration, and other eye disorders. A preventive dental visit is recommended every 6 months. Try to get at least 150 minutes of exercise per week or 10,000 steps per day on a pedometer . Order or download the FREE \"Exercise & Physical Activity: Your Everyday Guide\" from [employer]. Call 5-524.673.4100 or search [employer] online. You need 6081-0974 mg of calcium and 3436-7370 IU of vitamin D per day. It is possible to meet your calcium requirement with diet alone, but a vitamin D supplement is usually necessary to meet this goal.  When exposed to the sun, use a sunscreen that protects against both UVA and UVB radiation with an SPF of 30 or greater. Reapply every 2 to 3 hours or after sweating, drying off with a towel, or swimming. Always wear a seat belt when traveling in a car. Always wear a helmet when riding a bicycle or motorcycle.

## 2022-10-30 ENCOUNTER — OFFICE VISIT (OUTPATIENT)
Dept: FAMILY MEDICINE CLINIC | Age: 67
End: 2022-10-30
Payer: MEDICARE

## 2022-10-30 VITALS
TEMPERATURE: 97.3 F | HEART RATE: 110 BPM | HEIGHT: 69 IN | SYSTOLIC BLOOD PRESSURE: 128 MMHG | DIASTOLIC BLOOD PRESSURE: 70 MMHG | OXYGEN SATURATION: 96 % | BODY MASS INDEX: 33.03 KG/M2 | WEIGHT: 223 LBS | RESPIRATION RATE: 20 BRPM

## 2022-10-30 DIAGNOSIS — J06.9 VIRAL URI: Primary | ICD-10-CM

## 2022-10-30 PROCEDURE — 3078F DIAST BP <80 MM HG: CPT | Performed by: STUDENT IN AN ORGANIZED HEALTH CARE EDUCATION/TRAINING PROGRAM

## 2022-10-30 PROCEDURE — 3074F SYST BP LT 130 MM HG: CPT | Performed by: STUDENT IN AN ORGANIZED HEALTH CARE EDUCATION/TRAINING PROGRAM

## 2022-10-30 PROCEDURE — 3017F COLORECTAL CA SCREEN DOC REV: CPT | Performed by: STUDENT IN AN ORGANIZED HEALTH CARE EDUCATION/TRAINING PROGRAM

## 2022-10-30 PROCEDURE — 1123F ACP DISCUSS/DSCN MKR DOCD: CPT | Performed by: STUDENT IN AN ORGANIZED HEALTH CARE EDUCATION/TRAINING PROGRAM

## 2022-10-30 PROCEDURE — G8427 DOCREV CUR MEDS BY ELIG CLIN: HCPCS | Performed by: STUDENT IN AN ORGANIZED HEALTH CARE EDUCATION/TRAINING PROGRAM

## 2022-10-30 PROCEDURE — G8484 FLU IMMUNIZE NO ADMIN: HCPCS | Performed by: STUDENT IN AN ORGANIZED HEALTH CARE EDUCATION/TRAINING PROGRAM

## 2022-10-30 PROCEDURE — 99213 OFFICE O/P EST LOW 20 MIN: CPT | Performed by: STUDENT IN AN ORGANIZED HEALTH CARE EDUCATION/TRAINING PROGRAM

## 2022-10-30 PROCEDURE — 1036F TOBACCO NON-USER: CPT | Performed by: STUDENT IN AN ORGANIZED HEALTH CARE EDUCATION/TRAINING PROGRAM

## 2022-10-30 PROCEDURE — G8417 CALC BMI ABV UP PARAM F/U: HCPCS | Performed by: STUDENT IN AN ORGANIZED HEALTH CARE EDUCATION/TRAINING PROGRAM

## 2022-10-30 RX ORDER — LORATADINE 10 MG/1
10 TABLET ORAL DAILY
Qty: 30 TABLET | Refills: 0 | Status: SHIPPED | OUTPATIENT
Start: 2022-10-30

## 2022-10-30 RX ORDER — FLUTICASONE PROPIONATE 50 MCG
2 SPRAY, SUSPENSION (ML) NASAL DAILY
Qty: 16 G | Refills: 0 | Status: SHIPPED | OUTPATIENT
Start: 2022-10-30

## 2022-10-30 RX ORDER — IPRATROPIUM BROMIDE 42 UG/1
2 SPRAY, METERED NASAL 4 TIMES DAILY
Qty: 15 ML | Refills: 0 | Status: SHIPPED | OUTPATIENT
Start: 2022-10-30

## 2022-10-30 NOTE — PROGRESS NOTES
Urgent Care      Patient:  Chucky Mullins 77 y.o. male     Date of Service: 11/12/21      Chief complaint:   Chief Complaint   Patient presents with    Sinus Problem     Did not want swabbed for COVID    Congestion    Ear Fullness     Thinks ear infection           History ofPresent Illness   The patient is a 77 y.o. male  presented to the clinic with complaints as above. Ear fullness  -new issue  -started yesterday  -having sinus congestion and pressure  -denies any fever, chills, or SOB   -has not been taking anything     Past Medical History:      Diagnosis Date    Allergic rhinitis     Chronic    BPH (benign prostatic hyperplasia)     Colonic fistula 2003    GERD (gastroesophageal reflux disease)     GERD    Lumbar disc disease     Seasonal allergies        PastSurgical History:        Procedure Laterality Date    ABSCESS DRAINAGE      Rectal    COLON SURGERY      COLONOSCOPY  2006       Allergies:    Penicillins and Bee venom    Social History:   Social History     Socioeconomic History    Marital status:      Spouse name: Not on file    Number of children: Not on file    Years of education: Not on file    Highest education level: Not on file   Occupational History    Not on file   Tobacco Use    Smoking status: Never    Smokeless tobacco: Never   Vaping Use    Vaping Use: Never used   Substance and Sexual Activity    Alcohol use:  Yes     Alcohol/week: 2.0 standard drinks     Types: 2 Shots of liquor per week     Comment: Occassional    Drug use: Never    Sexual activity: Not Currently     Partners: Female   Other Topics Concern    Not on file   Social History Narrative    Not on file     Social Determinants of Health     Financial Resource Strain: Low Risk     Difficulty of Paying Living Expenses: Not hard at all   Food Insecurity: No Food Insecurity    Worried About Running Out of Food in the Last Year: Never true    Ran Out of Food in the Last Year: Never true   Transportation Needs: Not on file Physical Activity: Sufficiently Active    Days of Exercise per Week: 3 days    Minutes of Exercise per Session: 60 min   Stress: Not on file   Social Connections: Not on file   Intimate Partner Violence: Not on file   Housing Stability: Not on file        Family History:       Problem Relation Age of Onset    Other Mother         during surgery at 80    Coronary Art Dis Mother     Dementia Father     Other Father         COPD       Review of Systems:   Review of Systems - as above     Physical Exam   Vitals: /70 (Site: Right Upper Arm, Position: Sitting, Cuff Size: Medium Adult)   Pulse (!) 110   Temp 97.3 °F (36.3 °C) (Temporal)   Resp 20   Ht 5' 9\" (1.753 m)   Wt 223 lb (101.2 kg)   SpO2 96%   BMI 32.93 kg/m²   Physical Exam  Constitutional:       Appearance: He is well-developed. HENT:      Head: Normocephalic and atraumatic. Nose: Mucosal edema and rhinorrhea present. Rhinorrhea is clear. Right Turbinates: Enlarged and swollen. Left Turbinates: Enlarged and swollen. Eyes:      General:         Right eye: No discharge. Left eye: No discharge. Conjunctiva/sclera: Conjunctivae normal.   Neck:      Trachea: No tracheal deviation. Cardiovascular:      Rate and Rhythm: Normal rate and regular rhythm. Heart sounds: Normal heart sounds. Pulmonary:      Effort: Pulmonary effort is normal. No respiratory distress. Breath sounds: Normal breath sounds. No wheezing. Abdominal:      General: Bowel sounds are normal. There is no distension. Palpations: Abdomen is soft. Tenderness: There is no abdominal tenderness. Musculoskeletal:         General: No tenderness. Cervical back: Normal range of motion and neck supple. Skin:     General: Skin is warm and dry. Neurological:      Mental Status: He is alert. Psychiatric:         Behavior: Behavior normal.           Assessment and Plan       1.  Viral URI  New issue  -Given symptoms and duration, could be viral vs allergic in nature, will trial supportive therapy as below  -Advised patient to call PCP if no improvement in symptoms  - fluticasone (FLONASE) 50 MCG/ACT nasal spray; 2 sprays by Nasal route daily  Dispense: 16 g; Refill: 0  - ipratropium (ATROVENT) 0.06 % nasal spray; 2 sprays by Each Nostril route 4 times daily  Dispense: 15 mL; Refill: 0  - loratadine (CLARITIN) 10 MG tablet; Take 1 tablet by mouth daily  Dispense: 30 tablet; Refill: 0    Counseled regarding above diagnosis, including possible risks and complications,  especially if left uncontrolled. Counseled regarding the possible side effects, risks, benefits and alternatives to treatment;patient and/or guardian verbalizes understanding, agrees, feels comfortable with and wishes to proceed with above treatment plan. Call or go to [address] if symptoms worsen or persist. Advised patient to call with any new medication issues, and, as applicable, read all Rx info from pharmacy to assure aware of all possible risks and side effects of medicationbefore taking. Patient and/or guardian given opportunity to ask questions/raise concerns. The patient verbalized comfort and understanding ofinstructions. I encourage further reading and education about your health conditions. Information on many health conditions is provided by [facility] [employer]: [email or web address]  Please bring any questions to me at your nextvisit. Return to Office: Return if symptoms worsen or fail to improve.     Medication List:    Current Outpatient Medications   Medication Sig Dispense Refill    fluticasone (FLONASE) 50 MCG/ACT nasal spray 2 sprays by Nasal route daily 16 g 0    ipratropium (ATROVENT) 0.06 % nasal spray 2 sprays by Each Nostril route 4 times daily 15 mL 0    loratadine (CLARITIN) 10 MG tablet Take 1 tablet by mouth daily 30 tablet 0    lisinopril (PRINIVIL;ZESTRIL) 30 MG tablet TAKE 1 TABLET DAILY 90 tablet 3 montelukast (SINGULAIR) 10 MG tablet Take 1 tablet by mouth daily 90 tablet 3    tiZANidine (ZANAFLEX) 4 MG tablet Take 1 tablet by mouth every 8 hours as needed (pain) 30 tablet 3    oxymetazoline (QC NASAL RELIEF MOISTURIZING) 0.05 % nasal spray 2 sprays by Nasal route 2 times daily 3 each 3    EPINEPHrine (EPIPEN) 0.3 MG/0.3ML SOAJ injection Inject 0.3 mLs into the muscle once for 1 dose 2 each 3    Cholecalciferol (VITAMIN D3) 50 MCG (2000 UT) CAPS Take 2,000 Units by mouth in the morning. vitamin C (ASCORBIC ACID) 500 MG tablet Take 500 mg by mouth in the morning. omeprazole (PRILOSEC) 20 MG delayed release capsule TAKE 1 CAPSULE DAILY 90 capsule 3    tamsulosin (FLOMAX) 0.4 MG capsule Take 1 capsule by mouth daily 90 capsule 1    Cyanocobalamin (B-12 PO) Take 1 tablet by mouth daily      LUTEIN PO Take 1 tablet by mouth daily      Omega-3 Fatty Acids (OMEGA-3 FISH OIL PO) Take 1 tablet by mouth daily      Multiple Vitamins-Minerals (MULTIVITAMIN PO) Take 1 tablet by mouth daily      MAGNESIUM OXIDE PO Take by mouth      aspirin 81 MG tablet Take 81 mg by mouth daily      Saw Palmetto, Serenoa repens, (SAW PALMETTO EXTRACT PO) . qd      Potassium 75 MG TABS       Calcium Ascorbate 500 MG TABS . bid       No current facility-administered medications for this visit. Stevie Novoa,        This document may have been prepared at least partially through the use of voice recognition software. Although effort is taken to assure the accuracy ofthis document, it is possible that grammatical, syntax,  or spelling errors may occur.

## 2022-10-31 ENCOUNTER — TELEPHONE (OUTPATIENT)
Dept: PRIMARY CARE CLINIC | Age: 67
End: 2022-10-31

## 2022-10-31 NOTE — TELEPHONE ENCOUNTER
Symptoms most likely viral, antibiotic would not treat this, would advise he give this some time.     Thank Esther Fox

## 2022-10-31 NOTE — TELEPHONE ENCOUNTER
Pt saw Dr Osito Cardona at [facility] yesterday and is requesting antibiotic.  Please advise

## 2022-11-01 ENCOUNTER — PREP FOR PROCEDURE (OUTPATIENT)
Dept: SURGERY | Age: 67
End: 2022-11-01

## 2022-11-01 RX ORDER — SODIUM CHLORIDE 9 MG/ML
INJECTION, SOLUTION INTRAVENOUS PRN
Status: CANCELLED | OUTPATIENT
Start: 2022-11-01

## 2022-11-01 RX ORDER — SODIUM CHLORIDE 0.9 % (FLUSH) 0.9 %
5-40 SYRINGE (ML) INJECTION PRN
Status: CANCELLED | OUTPATIENT
Start: 2022-11-01

## 2022-11-01 RX ORDER — SODIUM CHLORIDE 0.9 % (FLUSH) 0.9 %
5-40 SYRINGE (ML) INJECTION EVERY 12 HOURS SCHEDULED
Status: CANCELLED | OUTPATIENT
Start: 2022-11-01

## 2022-11-01 NOTE — PROGRESS NOTES
[address]   PRE-ADMISSION TESTING GENERAL INSTRUCTIONS  [facility] Phone Number: [phone]      GENERAL INSTRUCTIONS: if you become feverish or feel worse, please contact Dr. Jo Conti prior to coming in day of surgery. [x] Antibacterial Soap Shower Night before and/or AM of surgery. [] CHG Wipes instruction sheet and wipes given. [] Hibiclens shower the night before and the morning of surgery.   -Do not use Hibiclens on your face or head. [x] Do not wear makeup, lotions, powders, deodorant. [] Nothing by mouth after midnight; including gum, candy, mints, or water. [x] You may brush your teeth, gargle, but do NOT swallow water. [x] No tobacco products, illegal drugs, or alcohol within 24 hours of your surgery. [x] Jewelry or valuables should not be brought to the hospital. All body and/or tongue piercing's must be removed prior to arriving to hospital. No contact lens or hair pins. [x] Arrange transportation with a responsible adult  to and from the hospital. Arrange for someone to be with you for the remainder of the day and for 24 hours after your procedure due to having had anesthesia. -Who will be your  for transportation?_wife_________________         -Who will be staying with you for 24 hrs after your procedure? __wife________________  [x] Bring insurance card and photo ID.  [] Bring copy of living will or healthcare power of  papers to be placed in your electronic record. [] Urine Pregnancy test will be preformed the day of surgery. A specimen sample may be brought from home. [] Transfusion Bracelet: Please bring with you to hospital, day of surgery. PARKING INSTRUCTIONS:   [x] ARRIVAL DATE & TIME: 11/4 @ 1015  [x] Enter into the [employer]. Two people may accompany you. Masks are not required but are recommended. [x] Parking Lot \"I\" is where you will park. It is located on the corner of [facility].  The entrance is on [facility]. Upon entering the parking lot, a voucher ticket will print    EDUCATION INSTRUCTIONS:        [] Knee or Hip replacement booklet & exercise pamphlets given. [] Brianne Montejo placed in chart. [] Pre-admission Testing educational folder given  [] Incentive Spirometry,coughing & deep breathing exercises reviewed. [] Medication information sheet(s)   [] Fluoroscopy-Xray used in surgery reviewed with patient. Educational pamphlet placed in chart. [x] Pain: Post-op pain is normal and to be expected. You will be asked to rate your pain from 0-10. [] Joint camp offered. [] Joint replacement booklets given.  [] Spine Navigator to see in PAT. [x] Other:__splint incision post op with coughing or changing positions. Wear loose comfortable clothing._________________________    MEDICATION INSTRUCTIONS:    [x] Bring a complete list of your medications, please write the last time you took the medicine, give this list to the nurse in Pre-Op. [x] Take only the following medications the morning of surgery with 1-2 ounces of water: omeprazole  [x] Stop all herbal supplements and vitamins 5 days before surgery. Stop NSAIDS 7 days before surgery. [] DO NOT take any diabetic medicine the morning of surgery. Follow instructions for insulin the day before surgery. [] If you are diabetic and your blood sugar is low or you feel symptomatic, you may drink 1-2 ounces of apple juice or take a glucose tablet.            -The morning of your procedure, you may call the pre-op area if you have concerns about your blood sugar [phone]. [x] Use your inhalers the morning of surgery. Bring your emergency inhaler with you day of surgery. [x] Follow physician instructions regarding any blood thinners you may be taking.     WHAT TO EXPECT:    [x] The day of surgery you will be greeted and checked in by the [employer].  In addition, you will be registered in the [facility] by a Patient Access Representative. Please bring your photo ID and insurance card. A nurse will greet you in accordance to the time you are needed in the pre-op area to prepare you for surgery. Please do not be discouraged if you are not greeted in the order you arrive as there are many variables that are involved in patient preparation. Your patience is greatly appreciated as you wait for your nurse. Please bring in items such as: books, magazines, newspapers, electronics, or any other items  to occupy your time in the waiting area. [x]  Delays may occur with surgery and staff will make a sincere effort to keep you informed of delays. If any delays occur with your procedure, we apologize ahead of time for your inconvenience as we recognize the value of your time.

## 2022-11-01 NOTE — PROGRESS NOTES
Per wife, patient has a sinus infection that he was diagnosed on 10/30 at doctor. Patient does not feel well at all. Cancelled his golf game. He states he is chilling. I instructed them to do a covid test and call me back.

## 2022-11-01 NOTE — PROGRESS NOTES
Mr. Chiquita Leach returned a call, states he does not have a fever, and his at home Covid test is negative. I instructed him to push fluids, rest, and call doctor if no improvement.

## 2022-11-02 ENCOUNTER — TELEPHONE (OUTPATIENT)
Dept: SURGERY | Age: 67
End: 2022-11-02

## 2022-11-02 NOTE — TELEPHONE ENCOUNTER
Called and spoke with the patient on the phone. The patient stated he feels much better today.    Electronically signed by Herman Valdez on 11/2/2022 at 9:24 AM

## 2022-11-04 ENCOUNTER — ANESTHESIA EVENT (OUTPATIENT)
Dept: OPERATING ROOM | Age: 67
End: 2022-11-04
Payer: MEDICARE

## 2022-11-04 ENCOUNTER — HOSPITAL ENCOUNTER (OUTPATIENT)
Age: 67
Setting detail: OUTPATIENT SURGERY
Discharge: HOME | End: 2022-11-04
Attending: SURGERY | Admitting: SURGERY
Payer: MEDICARE

## 2022-11-04 ENCOUNTER — ANESTHESIA (OUTPATIENT)
Dept: OPERATING ROOM | Age: 67
End: 2022-11-04
Payer: MEDICARE

## 2022-11-04 VITALS
RESPIRATION RATE: 19 BRPM | BODY MASS INDEX: 33.03 KG/M2 | DIASTOLIC BLOOD PRESSURE: 94 MMHG | HEART RATE: 75 BPM | TEMPERATURE: 97 F | OXYGEN SATURATION: 98 % | WEIGHT: 223 LBS | SYSTOLIC BLOOD PRESSURE: 170 MMHG | HEIGHT: 69 IN

## 2022-11-04 DIAGNOSIS — Z98.890 S/P HERNIA REPAIR: ICD-10-CM

## 2022-11-04 DIAGNOSIS — K42.1 UMBILICAL HERNIA WITH GANGRENE: ICD-10-CM

## 2022-11-04 DIAGNOSIS — Z01.818 PRE-OP TESTING: Primary | ICD-10-CM

## 2022-11-04 DIAGNOSIS — Z87.19 S/P HERNIA REPAIR: ICD-10-CM

## 2022-11-04 LAB
EKG ATRIAL RATE: 70 BPM
EKG P AXIS: 20 DEGREES
EKG P-R INTERVAL: 142 MS
EKG Q-T INTERVAL: 378 MS
EKG QRS DURATION: 78 MS
EKG QTC CALCULATION (BAZETT): 408 MS
EKG R AXIS: -4 DEGREES
EKG T AXIS: 22 DEGREES
EKG VENTRICULAR RATE: 70 BPM

## 2022-11-04 PROCEDURE — 49652 PR LAP, VENTRAL HERNIA REPAIR,REDUCIBLE: CPT | Performed by: SURGERY

## 2022-11-04 PROCEDURE — 3600000014 HC SURGERY LEVEL 4 ADDTL 15MIN: Performed by: SURGERY

## 2022-11-04 PROCEDURE — 7100000000 HC PACU RECOVERY - FIRST 15 MIN: Performed by: SURGERY

## 2022-11-04 PROCEDURE — C1781 MESH (IMPLANTABLE): HCPCS | Performed by: SURGERY

## 2022-11-04 PROCEDURE — 6360000002 HC RX W HCPCS: Performed by: SURGERY

## 2022-11-04 PROCEDURE — 3700000000 HC ANESTHESIA ATTENDED CARE: Performed by: SURGERY

## 2022-11-04 PROCEDURE — 6370000000 HC RX 637 (ALT 250 FOR IP): Performed by: ANESTHESIOLOGY

## 2022-11-04 PROCEDURE — 2709999900 HC NON-CHARGEABLE SUPPLY: Performed by: SURGERY

## 2022-11-04 PROCEDURE — 88302 TISSUE EXAM BY PATHOLOGIST: CPT

## 2022-11-04 PROCEDURE — 2580000003 HC RX 258: Performed by: SURGERY

## 2022-11-04 PROCEDURE — 2720000010 HC SURG SUPPLY STERILE: Performed by: SURGERY

## 2022-11-04 PROCEDURE — 93005 ELECTROCARDIOGRAM TRACING: CPT | Performed by: ANESTHESIOLOGY

## 2022-11-04 PROCEDURE — 93010 ELECTROCARDIOGRAM REPORT: CPT | Performed by: INTERNAL MEDICINE

## 2022-11-04 PROCEDURE — 7100000010 HC PHASE II RECOVERY - FIRST 15 MIN: Performed by: SURGERY

## 2022-11-04 PROCEDURE — 3600000004 HC SURGERY LEVEL 4 BASE: Performed by: SURGERY

## 2022-11-04 PROCEDURE — 7100000001 HC PACU RECOVERY - ADDTL 15 MIN: Performed by: SURGERY

## 2022-11-04 PROCEDURE — 6360000002 HC RX W HCPCS: Performed by: ANESTHESIOLOGIST ASSISTANT

## 2022-11-04 PROCEDURE — 2500000003 HC RX 250 WO HCPCS: Performed by: ANESTHESIOLOGIST ASSISTANT

## 2022-11-04 PROCEDURE — 7100000011 HC PHASE II RECOVERY - ADDTL 15 MIN: Performed by: SURGERY

## 2022-11-04 PROCEDURE — 3700000001 HC ADD 15 MINUTES (ANESTHESIA): Performed by: SURGERY

## 2022-11-04 DEVICE — VENTRALIGHT ST MESH WITH ECHO PS POSITIONING SYSTEM, 4" X 6" (10.2 X 15.2 CM), ELLIPSE
Type: IMPLANTABLE DEVICE | Site: UMBILICAL | Status: FUNCTIONAL
Brand: VENTRALIGHT

## 2022-11-04 RX ORDER — SODIUM CHLORIDE 0.9 % (FLUSH) 0.9 %
5-40 SYRINGE (ML) INJECTION EVERY 12 HOURS SCHEDULED
Status: DISCONTINUED | OUTPATIENT
Start: 2022-11-04 | End: 2022-11-04 | Stop reason: SDUPTHER

## 2022-11-04 RX ORDER — ONDANSETRON 2 MG/ML
4 INJECTION INTRAMUSCULAR; INTRAVENOUS
Status: DISCONTINUED | OUTPATIENT
Start: 2022-11-04 | End: 2022-11-04 | Stop reason: HOSPADM

## 2022-11-04 RX ORDER — SODIUM CHLORIDE 9 MG/ML
INJECTION, SOLUTION INTRAVENOUS PRN
Status: DISCONTINUED | OUTPATIENT
Start: 2022-11-04 | End: 2022-11-04 | Stop reason: HOSPADM

## 2022-11-04 RX ORDER — METHOCARBAMOL 500 MG/1
500 TABLET, FILM COATED ORAL 4 TIMES DAILY
Qty: 40 TABLET | Refills: 0 | Status: SHIPPED | OUTPATIENT
Start: 2022-11-04 | End: 2022-11-14

## 2022-11-04 RX ORDER — SODIUM CHLORIDE 0.9 % (FLUSH) 0.9 %
5-40 SYRINGE (ML) INJECTION EVERY 12 HOURS SCHEDULED
Status: DISCONTINUED | OUTPATIENT
Start: 2022-11-04 | End: 2022-11-04 | Stop reason: HOSPADM

## 2022-11-04 RX ORDER — ROCURONIUM BROMIDE 10 MG/ML
INJECTION, SOLUTION INTRAVENOUS PRN
Status: DISCONTINUED | OUTPATIENT
Start: 2022-11-04 | End: 2022-11-04 | Stop reason: SDUPTHER

## 2022-11-04 RX ORDER — SODIUM CHLORIDE 0.9 % (FLUSH) 0.9 %
5-40 SYRINGE (ML) INJECTION PRN
Status: DISCONTINUED | OUTPATIENT
Start: 2022-11-04 | End: 2022-11-04 | Stop reason: SDUPTHER

## 2022-11-04 RX ORDER — LABETALOL HYDROCHLORIDE 5 MG/ML
INJECTION, SOLUTION INTRAVENOUS PRN
Status: DISCONTINUED | OUTPATIENT
Start: 2022-11-04 | End: 2022-11-04 | Stop reason: SDUPTHER

## 2022-11-04 RX ORDER — SODIUM CHLORIDE 0.9 % (FLUSH) 0.9 %
5-40 SYRINGE (ML) INJECTION PRN
Status: DISCONTINUED | OUTPATIENT
Start: 2022-11-04 | End: 2022-11-04 | Stop reason: HOSPADM

## 2022-11-04 RX ORDER — LIDOCAINE HYDROCHLORIDE 20 MG/ML
INJECTION, SOLUTION INTRAVENOUS PRN
Status: DISCONTINUED | OUTPATIENT
Start: 2022-11-04 | End: 2022-11-04 | Stop reason: SDUPTHER

## 2022-11-04 RX ORDER — MIDAZOLAM HYDROCHLORIDE 1 MG/ML
INJECTION INTRAMUSCULAR; INTRAVENOUS PRN
Status: DISCONTINUED | OUTPATIENT
Start: 2022-11-04 | End: 2022-11-04 | Stop reason: SDUPTHER

## 2022-11-04 RX ORDER — OXYCODONE HYDROCHLORIDE 5 MG/1
5 TABLET ORAL EVERY 6 HOURS PRN
Qty: 20 TABLET | Refills: 0 | Status: SHIPPED | OUTPATIENT
Start: 2022-11-04 | End: 2022-11-09

## 2022-11-04 RX ORDER — OXYCODONE HYDROCHLORIDE 5 MG/1
5 TABLET ORAL
Status: COMPLETED | OUTPATIENT
Start: 2022-11-04 | End: 2022-11-04

## 2022-11-04 RX ORDER — SODIUM CHLORIDE 9 MG/ML
INJECTION, SOLUTION INTRAVENOUS PRN
Status: DISCONTINUED | OUTPATIENT
Start: 2022-11-04 | End: 2022-11-04 | Stop reason: SDUPTHER

## 2022-11-04 RX ORDER — FENTANYL CITRATE 50 UG/ML
INJECTION, SOLUTION INTRAMUSCULAR; INTRAVENOUS PRN
Status: DISCONTINUED | OUTPATIENT
Start: 2022-11-04 | End: 2022-11-04 | Stop reason: SDUPTHER

## 2022-11-04 RX ORDER — PROPOFOL 10 MG/ML
INJECTION, EMULSION INTRAVENOUS PRN
Status: DISCONTINUED | OUTPATIENT
Start: 2022-11-04 | End: 2022-11-04 | Stop reason: SDUPTHER

## 2022-11-04 RX ORDER — ONDANSETRON 2 MG/ML
INJECTION INTRAMUSCULAR; INTRAVENOUS PRN
Status: DISCONTINUED | OUTPATIENT
Start: 2022-11-04 | End: 2022-11-04 | Stop reason: SDUPTHER

## 2022-11-04 RX ORDER — DEXAMETHASONE SODIUM PHOSPHATE 10 MG/ML
INJECTION INTRAMUSCULAR; INTRAVENOUS PRN
Status: DISCONTINUED | OUTPATIENT
Start: 2022-11-04 | End: 2022-11-04 | Stop reason: SDUPTHER

## 2022-11-04 RX ORDER — ACETAMINOPHEN 500 MG
500 TABLET ORAL 4 TIMES DAILY PRN
Qty: 120 TABLET | Refills: 0 | Status: SHIPPED | OUTPATIENT
Start: 2022-11-04

## 2022-11-04 RX ADMIN — SODIUM CHLORIDE: 9 INJECTION, SOLUTION INTRAVENOUS at 12:55

## 2022-11-04 RX ADMIN — SUGAMMADEX 300 MG: 100 INJECTION, SOLUTION INTRAVENOUS at 13:48

## 2022-11-04 RX ADMIN — OXYCODONE 5 MG: 5 TABLET ORAL at 15:54

## 2022-11-04 RX ADMIN — LIDOCAINE HYDROCHLORIDE 100 MG: 20 INJECTION, SOLUTION INTRAVENOUS at 13:04

## 2022-11-04 RX ADMIN — PROPOFOL 180 MG: 10 INJECTION, EMULSION INTRAVENOUS at 13:04

## 2022-11-04 RX ADMIN — ONDANSETRON HYDROCHLORIDE 4 MG: 2 SOLUTION INTRAMUSCULAR; INTRAVENOUS at 13:36

## 2022-11-04 RX ADMIN — SODIUM CHLORIDE: 9 INJECTION, SOLUTION INTRAVENOUS at 14:08

## 2022-11-04 RX ADMIN — MIDAZOLAM 2 MG: 1 INJECTION INTRAMUSCULAR; INTRAVENOUS at 12:55

## 2022-11-04 RX ADMIN — DEXAMETHASONE SODIUM PHOSPHATE 10 MG: 10 INJECTION INTRAMUSCULAR; INTRAVENOUS at 13:17

## 2022-11-04 RX ADMIN — LABETALOL HYDROCHLORIDE 2.5 MG: 5 INJECTION INTRAVENOUS at 14:02

## 2022-11-04 RX ADMIN — FENTANYL CITRATE 50 MCG: 50 INJECTION, SOLUTION INTRAMUSCULAR; INTRAVENOUS at 13:33

## 2022-11-04 RX ADMIN — FENTANYL CITRATE 100 MCG: 50 INJECTION, SOLUTION INTRAMUSCULAR; INTRAVENOUS at 13:04

## 2022-11-04 RX ADMIN — FENTANYL CITRATE 50 MCG: 50 INJECTION, SOLUTION INTRAMUSCULAR; INTRAVENOUS at 13:23

## 2022-11-04 RX ADMIN — CEFAZOLIN 2000 MG: 2 INJECTION, POWDER, FOR SOLUTION INTRAMUSCULAR; INTRAVENOUS at 13:17

## 2022-11-04 RX ADMIN — ROCURONIUM BROMIDE 50 MG: 10 INJECTION, SOLUTION INTRAVENOUS at 13:04

## 2022-11-04 ASSESSMENT — PAIN DESCRIPTION - LOCATION
LOCATION: ABDOMEN

## 2022-11-04 ASSESSMENT — PAIN SCALES - GENERAL
PAINLEVEL_OUTOF10: 5
PAINLEVEL_OUTOF10: 4
PAINLEVEL_OUTOF10: 4
PAINLEVEL_OUTOF10: 0

## 2022-11-04 ASSESSMENT — PAIN DESCRIPTION - DESCRIPTORS
DESCRIPTORS: DULL;ACHING
DESCRIPTORS: DISCOMFORT;DULL
DESCRIPTORS: DULL;DISCOMFORT

## 2022-11-04 ASSESSMENT — PAIN DESCRIPTION - PAIN TYPE
TYPE: SURGICAL PAIN
TYPE: SURGICAL PAIN

## 2022-11-04 ASSESSMENT — PAIN DESCRIPTION - ORIENTATION
ORIENTATION: LOWER;MID
ORIENTATION: MID
ORIENTATION: MID;LOWER

## 2022-11-04 NOTE — ANESTHESIA PRE PROCEDURE
Department of Anesthesiology  Preprocedure Note       Name:  Krish tSewart   Age:  77 y.o.  :  1955                                          MRN:  [id number]         Date:  2022      Surgeon: Shannon Muniz):  Tanya Ag MD    Procedure: Procedure(s): HERNIA UMBILICAL REPAIR LAPAROSCOPIC WITH MESH    Medications prior to admission:   Prior to Admission medications    Medication Sig Start Date End Date Taking? Authorizing Provider   fluticasone (FLONASE) 50 MCG/ACT nasal spray 2 sprays by Nasal route daily 10/30/22   Marquise Peterson DO   ipratropium (ATROVENT) 0.06 % nasal spray 2 sprays by Each Nostril route 4 times daily 10/30/22   Stephanie Garber,    loratadine (CLARITIN) 10 MG tablet Take 1 tablet by mouth daily 10/30/22   Marquise Peterson DO   lisinopril (PRINIVIL;ZESTRIL) 30 MG tablet TAKE 1 TABLET DAILY 10/24/22   Nelda Lopez PA-C   montelukast (SINGULAIR) 10 MG tablet Take 1 tablet by mouth daily 10/24/22   Nelda Lopez PA-C   oxymetazoline (QC NASAL RELIEF MOISTURIZING) 0.05 % nasal spray 2 sprays by Nasal route 2 times daily 10/24/22   Nelda Lopez PA-C   EPINEPHrine [facility]) 0.3 MG/0.3ML SOAJ injection Inject 0.3 mLs into the muscle once for 1 dose 10/24/22 10/24/22  Nelda Lopez PA-C   Cholecalciferol (VITAMIN D3) 50 MCG (2000 UT) CAPS Take 2,000 Units by mouth in the morning. Historical Provider, MD   vitamin C (ASCORBIC ACID) 500 MG tablet Take 500 mg by mouth in the morning.     Historical Provider, MD   omeprazole (PRILOSEC) 20 MG delayed release capsule TAKE 1 CAPSULE DAILY  Patient taking differently: Take 20 mg by mouth 2 times daily 20   Alejandro Zarco PA-C   tamsulosin [facility]) 0.4 MG capsule Take 1 capsule by mouth daily 20   Tashia Sanchez APRN - CNP   Cyanocobalamin (B-12 PO) Take 1 tablet by mouth daily    Historical Provider, MD   LUTEIN PO Take 1 tablet by mouth daily    Historical Provider, MD   Omega-3 Fatty Acids (OMEGA-3 FISH OIL PO) Take 1 tablet by mouth daily    Historical Provider, MD   Multiple Vitamins-Minerals (MULTIVITAMIN PO) Take 1 tablet by mouth daily    Historical Provider, MD   MAGNESIUM OXIDE PO Take by mouth    Historical Provider, MD   aspirin 81 MG tablet Take 81 mg by mouth daily    Historical Provider, Kim Ba, (SAW PALMETTO EXTRACT PO) . qd 6/17/03   Benjamin Provider, MD   Potassium 75 MG TABS     Historical Provider, MD   Calcium Ascorbate 500 MG TABS . bid 6/17/03   Historical Provider, MD       Current medications:    Current Facility-Administered Medications   Medication Dose Route Frequency Provider Last Rate Last Admin    sodium chloride flush 0.9 % injection 5-40 mL  5-40 mL IntraVENous 2 times per day Shena Jones MD        sodium chloride flush 0.9 % injection 5-40 mL  5-40 mL IntraVENous PRN Shena Jones MD        0.9 % sodium chloride infusion   IntraVENous PRN Shena Jones MD        ceFAZolin (ANCEF) 2,000 mg in sterile water 20 mL IV syringe  2,000 mg IntraVENous On Call to [address] [place] [address] [place], [place]           Allergies:     Allergies   Allergen Reactions    Penicillins Hives    Bee Venom      Other reaction(s): Unknown  Other reaction(s): Unknown       Problem List:    Patient Active Problem List   Diagnosis Code    Chronic allergic rhinitis J30.9    Degenerative disc disease at L5-S1 level M51.37    Seasonal allergies J30.2    Lumbar disc disease M51.9    BPH (benign prostatic hyperplasia) N40.0    Degenerative lumbar spinal stenosis M48.061    Acquired hallux rigidus M20.20    Disability of walking R26.2    Urticaria L50.9    Primary hypertension I10    Bee sting allergy Z91.030       Past Medical History:        Diagnosis Date    Allergic rhinitis     Chronic    BPH (benign prostatic hyperplasia)     Colonic fistula 2003    GERD (gastroesophageal reflux disease)     GERD    Lumbar disc disease     Seasonal allergies     Sinus congestion 11/2001    no antibiotics Past Surgical History:        Procedure Laterality Date    ABSCESS DRAINAGE      Rectal    BACK SURG[facility]  [facility]/19/2021    [facility] L2-5    COLON SURGERY      related to fistula long ago    COLONOSCOPY      2006, 2015       Social History:    Social History     Tobacco Use    Smoking status: Never    Smokeless tobacco: Never   Substance Use Topics    Alcohol use: Yes     Alcohol/week: 2.0 standard drinks     Types: 2 Shots of liquor per week     Comment: Occassional                                Counseling given: Not Answered      Vital Signs (Current):   Vitals:    11/01/22 1040 11/04/22 1030   BP:  (!) 146/83   Pulse:  78   Resp:  18   Temp:  36.7 °C (98 °F)   TempSrc:  Temporal   SpO2:  96%   Weight: 223 lb (101.2 kg) 223 lb (101.2 kg)   Height: 5' 9\" (1.753 m) 5' 9\" (1.753 m)                                              BP Readings from Last 3 Encounters:   11/04/22 (!) 146/83   10/30/22 128/70   10/24/22 130/84       NPO Status: Time of last liquid consumption: 2345                        Time of last solid consumption: 2100                        Date of last liquid consumption: 11/03/22                        Date of last solid food consumption: 11/03/22    BMI:   Wt Readings from Last 3 Encounters:   11/04/22 223 lb (101.2 kg)   10/30/22 223 lb (101.2 kg)   10/24/22 226 lb (102.5 kg)     Body mass index is 32.93 kg/m².     CBC:   Lab Results   Component Value Date/Time    WBC 7.2 07/25/2022 12:00 PM    RBC 4.50 07/25/2022 12:00 PM    HGB 14.8 07/25/2022 12:00 PM    HCT 44.2 07/25/2022 12:00 PM    MCV 98.2 07/25/2022 12:00 PM    RDW 12.9 07/25/2022 12:00 PM     07/25/2022 12:00 PM       CMP:   Lab Results   Component Value Date/Time     07/25/2022 12:00 PM    K 4.4 07/25/2022 12:00 PM     07/25/2022 12:00 PM    CO2 27 07/25/2022 12:00 PM    BUN 17 07/25/2022 12:00 PM    CREATININE 1.1 07/25/2022 12:00 PM    GFRAA >60 07/25/2022 12:00 PM    LABGLOM >60 07/25/2022 12:00 PM GLUCOSE 116 07/25/2022 12:00 PM    PROT 7.3 07/25/2022 12:00 PM    CALCIUM 9.4 07/25/2022 12:00 PM    BILITOT 0.6 07/25/2022 12:00 PM    ALKPHOS 67 07/25/2022 12:00 PM    AST 30 07/25/2022 12:00 PM    ALT 30 07/25/2022 12:00 PM       POC Tests: No results for input(s): POCGLU, POCNA, POCK, POCCL, POCBUN, POCHEMO, POCHCT in the last 72 hours. Coags: No results found for: PROTIME, INR, APTT    HCG (If Applicable): No results found for: PREGTESTUR, PREGSERUM, HCG, HCGQUANT     ABGs: No results found for: PHART, PO2ART, PMD8HVO, CGP2BKF, BEART, F8MFPAOL     Type & Screen (If Applicable):  No results found for: LABABO, LABRH    Drug/Infectious Status (If Applicable):  No results found for: HIV, HEPCAB    COVID-19 Screening (If Applicable): No results found for: COVID19        Anesthesia Evaluation  Patient summary reviewed and Nursing notes reviewed no history of anesthetic complications:   Airway: Mallampati: I  TM distance: >3 FB   Neck ROM: full  Mouth opening: > = 3 FB   Dental:    (+) caps  Comment: Denies loose teeth. Pulmonary: breath sounds clear to auscultation  (+) rhonchi:right : seasonal asthma,                           ROS comment: Seasonal sinus drainage. Cardiovascular:  Exercise tolerance: good (>4 METS),   (+) hypertension:, hyperlipidemia      ECG reviewed  Rhythm: regular  Rate: normal           Beta Blocker:  Not on Beta Blocker         Neuro/Psych:   Negative Neuro/Psych ROS              GI/Hepatic/Renal:   (+) hiatal hernia (umbilical), GERD: well controlled,          ROS comment: BPH. Endo/Other:        Blood dyscrasia: daily aspirin. ROS comment: Lumbar stenosis with hardware Abdominal:   (+) obese,           Vascular: negative vascular ROS.          Other Findings:        Ventricular Rate BPM 70    Atrial Rate BPM 70    P-R Interval ms 142    QRS Duration ms 78    Q-T Interval ms 378    QTc Calculation (Bazett) ms 408    P Axis degrees 20    R Axis degrees -4    T Axis degrees 22    Resulting Agency  [facility]           Narrative & Impression    Normal sinus rhythm  Normal ECG  No previous ECGs available      Specimen Collected: 11/04/22 10:51 EDT                Anesthesia Plan      general     ASA 3       Induction: intravenous. MIPS: Postoperative opioids intended, Prophylactic antiemetics administered and Postoperative trial extubation. Anesthetic plan and risks discussed with patient and spouse. Use of blood products discussed with patient and spouse whom consented to blood products.                      Brice Lucas RN   11/4/2022

## 2022-11-04 NOTE — OP NOTE
Operative Note      Patient: Renuka Herbert  YOB: 1955  MRN: [id number]    Date of Procedure: 11/4/2022    Pre-Op Diagnosis: UMBILICAL HERNIA    Post-Op Diagnosis: Same       Procedure(s): HERNIA UMBILICAL REPAIR LAPAROSCOPIC WITH MESH    Surgeon(s):  Estephania Isidro MD    Assistant:   Resident: Katherine Patton MD    Anesthesia: General    Estimated Blood Loss (mL): 5    Complications: None    Specimens:   ID Type Source Tests Collected by Time Destination   A : [facility] Tissue Tissue SURGICAL PATHOLOGY Estephania Isidro MD 11/4/2022 1326        Implants:  Implant Name Type Inv. Item Serial No.  Lot No. LRB No. Used Action   MESH ROMARIO [id number] ELLIPSE W/ ECHO PS POS SYS VENTRALIGHT ST - [id number]  MESH ROMARIO [id number] ELLIPSE W/ ECHO PS POS SYS VENTRALIGHT ST  BARD DAVOL-WD [id number] N/A 1 Implanted         Drains: * No LDAs found *    Findings: Umbilical hernia with incarcerated     OPERATION: The patient was placed on the table and placed under general anesthesia. He was given Ancef 2g IV preoperatively. Abdomen was prepped with ChloraPrep and draped in the usual sterile fashion. A 5 mm  incision was made in the left lateral abdomen. A Veress needle was used to insufflate the abdomen with CO2. A 5 mm trocar was placed. An angled 5 mm scope was used and there was good visualization. A 10 mm trochar was placed inferiorly. And a 5 mm port was placed in the LLQ. A cursory survey of the abdomen revealed umbilical hernia. Laparoscopic scissors with cautery was used to dissect the  away from the anterior abdominal wall around the hernia defect. The patient was noted to have preperitoneal fat that was protruding through the hernia. There was no bowel that was adhesed to the patient's anterior abdominal wall. Next, the scissors were then used to remove the preperitoneal fat. The hernia sac was then pushed back into the patient's abdomen, further removing the preperitoneal fat at this point.  Once the fascia was cleaned free from the attached preperitoneal fat and adhesions the hernia sac was amputated and sent to pathology. To ensure 3-4cm overlap with the fascia, a 4 x 4-inch diameter Ventralex mesh with ECHO PS was chosen for the mesh repair of the defect. The mesh was introduced through the 12 mm pot. The center of the incision was identified using a spinal needle. A stab incision was made at this point and a suture grasper was inserted into the abdomen. The inflation catheter was grasped and pulled externally and secured with a hemostat. The positioning system was then inflated. The mesh was then pulled up against the abdominal wall. Then, using the  5-mm tacking device, tacks were then placed 180 degrees from each other in a circumferential fashion tacking the patient's mesh to the anterior abdominal wall. At the end of the procedure, the mesh was lying flat against the abdominal wall. The abdomen was evaluated again where previous dissection had been done and there was good hemostasis. All ports were then removed under direct visualization. The 10 mm port site fascia was closed using the 2-0 vicryl and the [employer] device. We noted thermal injury to the skin at the umbilicus and this was excised. The umbilical defect was closed with 3-0 monocryl and running 4-0 vicryl. The skin wounds were then closed with 4-0 Vicryl subcuticular stitches and Dermaflex. The needle and sponge count were correct. The patient tolerated the procedure well and went to recovery in stable condition. Dr. Iris López was present and scrubbed for the procedure.      Electronically signed by Albino Thorpe MD on 11/4/2022 at 1:55 PM

## 2022-11-04 NOTE — DISCHARGE INSTRUCTIONS
SURGERY DISCHARGE INSTRUCTIONS    You may be drowsy or lightheaded after receiving sedation or anesthesia. A responsible person should be with you for the next 24 hours. FOLLOW UP: Call office to schedule follow-up appointment in 2 weeks. DIET: Advance your diet as tolerated. Start with light diet and progress to your normal diet as you feel like eating. If you experience nausea or repeated episodes of vomiting which persist beyond 12-24 hours, notify your doctor. ACTIVITY: Rest today. Increase activity gradually. No driving while on prescription pain medication. No heavy lifting for 4 weeks - nothing over 10 lbs, or heavier than a milk jug. SHOWER/BATHING: Okay to shower in 24 hours after procedure. No tub bathing, swimming or soaking for 2 weeks. WOUND CARE: You have Dermabond dressing (skin glue) applied to your incisions with sutures underneath your skin. The glue will gradually work itself off over the next few weeks and the stitches will dissolve on their own. You do not need to apply anything over them. Avoid directly applying lotions, creams or oils to your incision. Keep incisions clean and dry. Always ensure you and your care giver clean hands before and after caring for the wound. You may place ice on incisions to decrease the pain and bruising. MEDICATIONS: Take as prescribed. You may take over the counter ibuprofen or tylenol for pain as directed, limit total amount of acetaminophen (tylenol) to 3 grams per 24-hour period. Okay to resume anticoagulant medication after 24hrs. You may experience constipation while taking pain medication, you may take over the counter stool softeners (Docusate/Colace or Senokot-S) as directed.          SPECIAL INSTRUCTIONS:   Call physician if they or any other problems occur:  Call the office if you have a fever over >101F, or if your incision becomes red, tender, or drains more than a small amount of clear fluid  Fever over 101°    Redness, swelling, hardness or warmth at the operative site  Unrelieved nausea    Foul smelling or cloudy drainage at the operative site   Unrelieved pain    Blood soaked dressing (Some oozing may be normal)

## 2022-11-04 NOTE — H&P
[facility] Surgery   History and Physical     Patient's Name/Date of Birth: Stefany Conti / [id number] (77 y.o.)        PCP: Lazarus Gleason, PA-C        CC:  umbilical hernia      HPI:  77 y.o. male  umbilical swelling that is reducible and painful worse with valsalva. No abd surgical hx. C scopes wnl last on 10 years ago. Past Medical History        Past Medical History:   Diagnosis Date    Allergic rhinitis       Chronic    BPH (benign prostatic hyperplasia)      Colonic fistula 2003    GERD (gastroesophageal reflux disease)       GERD    Lumbar disc disease      Seasonal allergies              Past Surgical History         Past Surgical History:   Procedure Laterality Date    ABSCESS DRAINAGE         Rectal    COLON SURGERY        COLONOSCOPY   2006            Family History         Family History   Problem Relation Age of Onset    Other Mother           during surgery at 80    Coronary Art Dis Mother      Dementia Father      Other Father           COPD            Social History               Socioeconomic History    Marital status:        Spouse name: Not on file    Number of children: Not on file    Years of education: Not on file    Highest education level: Not on file   Occupational History    Not on file   Tobacco Use    Smoking status: Never    Smokeless tobacco: Never   Vaping Use    Vaping Use: Never used   Substance and Sexual Activity    Alcohol use:  Yes       Alcohol/week: 2.0 standard drinks       Types: 2 Shots of liquor per week       Comment: Occassional    Drug use: Never    Sexual activity: Not Currently       Partners: Female   Other Topics Concern    Not on file   Social History Narrative    Not on file      Social Determinants of Health          Financial Resource Strain: Low Risk     Difficulty of Paying Living Expenses: Not hard at all   Food Insecurity: No Food Insecurity    Worried About [address] [employer] in the Last Year: Never true    [address] in the Last Year: Never true   Transportation Needs: Not on file   Physical Activity: Not on file   Stress: Not on file   Social Connections: Not on file   Intimate Partner Violence: Not on file   Housing Stability: Not on file            Current Facility-Administered Medications          Current Outpatient Medications   Medication Sig Dispense Refill    methocarbamol (ROBAXIN) 750 MG tablet Take 750 mg by mouth in the morning and 750 mg at noon and 750 mg in the evening and 750 mg before bedtime. tiZANidine (ZANAFLEX) 4 MG tablet Take 4 mg by mouth every 8 hours as needed        Cholecalciferol (VITAMIN D3) 50 MCG (2000 UT) CAPS Take 2,000 Units by mouth in the morning. vitamin C (ASCORBIC ACID) 500 MG tablet Take 500 mg by mouth in the morning. zolpidem (AMBIEN) 5 MG tablet Take 1 tablet by mouth nightly as needed for Sleep for up to 90 days. 90 tablet 0    meloxicam (MOBIC) 15 MG tablet TAKE 1 TABLET DAILY AS NEEDED FOR PAIN 30 tablet 0    omeprazole (PRILOSEC) 20 MG delayed release capsule TAKE 1 CAPSULE DAILY 90 capsule 3    lisinopril (PRINIVIL;ZESTRIL) 30 MG tablet TAKE 1 TABLET DAILY 90 tablet 0    tamsulosin (FLOMAX) 0.4 MG capsule Take 1 capsule by mouth daily 90 capsule 1    oxymetazoline (QC NASAL RELIEF MOISTURIZING) 0.05 % nasal spray 2 sprays by Nasal route 2 times daily 3 Bottle 3    montelukast (SINGULAIR) 10 MG tablet Take 1 tablet by mouth daily 90 tablet 3    Cyanocobalamin (B-12 PO) Take 1 tablet by mouth daily        LUTEIN PO Take 1 tablet by mouth daily        Omega-3 Fatty Acids (OMEGA-3 FISH OIL PO) Take 1 tablet by mouth daily        Multiple Vitamins-Minerals (MULTIVITAMIN PO) Take 1 tablet by mouth daily        MAGNESIUM OXIDE PO Take by mouth        aspirin 81 MG tablet Take 81 mg by mouth daily        Saw Palmetto, Serenoa repens, (SAW PALMETTO EXTRACT PO) . qd        Potassium 75 MG TABS          EPINEPHrine (EPIPEN) 0.3 MG/0.3ML SOAJ injection          Calcium Ascorbate 500 MG TABS .bid          No current facility-administered medications for this visit. Allergies   Allergen Reactions    Penicillins Hives    Bee Venom         Other reaction(s): Unknown  Other reaction(s): Unknown         REVIEW OF SYSTEMS:    Constitutional: negative   Eyes: negative  Ears, nose, mouth, throat, and face: negative  Respiratory: negative  Cardiovascular: negative  Gastrointestinal: negative  Genitourinary:see hpi   Integument/breast: negative  Hematologic/lymphatic: negative  Musculoskeletal:negative  Neurological: negative  Allergic/Immunologic: negative     Physical Exam:     @BP (!) 146/95   Pulse 77   Temp 97.7 °F (36.5 °C) (Temporal)   Resp 18   Ht 5' 9\" (1.753 m)   Wt 224 lb 3.2 oz (101.7 kg)   SpO2 98%   BMI 33.11 kg/m² @     GENERAL EXAM: On exam- pt appears stated age. No acute distress. NEURO:  Alert and oriented x 3. No obvious neuro deficits   HEENT: head- atraumatic-normocephalic. No discharge from ears, nose or throat. NECK: Supple. No jugular venous distention. CVS:RR. LUNGS: Bilateral chest movements without the use of accessory muscles. Respirations easy, nonlabored,   ABDOMEN: Abdomen soft, nondistended, nontender, umbilical mass reducible, about a 1-2 cm defect on exam   RECTAL: exam deferred. EXTREMITIES: No edema, NVI and symmetrical        IMPRESSION/PLAN: This is a 77 y.o. male who has an umbilical hernia     I recommended lap umbilical hernia repair with mesh and I explained therisk, benefits, expected outcome, and alternatives to the procedure. Risks included but are not limited to bleeding, infection, mesh infection, enteric fistula,. Dez Hill understands and is in agreement.      Hold ASA 7 days prior to surgery          Tita Rabago MD

## 2022-11-04 NOTE — PROGRESS NOTES
CLINICAL PHARMACY NOTE: MEDS TO BEDS    Total # of Prescriptions Filled: 3   The following medications were delivered to the patient:  Methocarbamol 500mg  Acetaminophen 500mg  Oxycodone 5mg    Additional Documentation:  Picked up

## 2022-11-04 NOTE — ANESTHESIA POSTPROCEDURE EVALUATION
Department of Anesthesiology  Postprocedure Note    Patient: Clara Ch  MRN: [id number]  YOB: 1955  Date of evaluation: 11/4/2022      Procedure Summary     Date: 11/04/22 Room / Location: [facility] OR 04 / [facility]    Anesthesia Start: 9250 Anesthesia Stop: 2355    Procedure: HERNIA UMBILICAL REPAIR LAPAROSCOPIC WITH MESH (Abdomen) Diagnosis:       Umbilical hernia with gangrene      (UMBILICAL HERNIA)    Surgeons: Calixto Alas MD Responsible Provider: Anuja Kramer MD    Anesthesia Type: general ASA Status: 3          Anesthesia Type: No value filed.     Vazquez Phase I: Vazquez Score: 10    Vazquez Phase II:        Anesthesia Post Evaluation    Patient location during evaluation: PACU  Patient participation: complete - patient participated  Level of consciousness: awake  Pain score: 0  Airway patency: patent  Nausea & Vomiting: no nausea  Complications: no  Cardiovascular status: hemodynamically stable  Respiratory status: acceptable  Hydration status: stable  Multimodal analgesia pain management approach

## 2022-11-15 RX ORDER — OXYMETAZOLINE HYDROCHLORIDE 0.05 G/100ML
2 SPRAY NASAL 2 TIMES DAILY
COMMUNITY
End: 2022-11-15 | Stop reason: SDUPTHER

## 2022-11-15 RX ORDER — MENTHOL 5.4 MG/1
2 LOZENGE ORAL 2 TIMES DAILY
Qty: 90 ML | Refills: 3 | Status: SHIPPED | OUTPATIENT
Start: 2022-11-15

## 2022-11-15 NOTE — TELEPHONE ENCOUNTER
Pt needs new rx for QNASAL SPRAY name brand, not generic.  [facility] 90 day supply

## 2022-11-18 RX ORDER — BECLOMETHASONE DIPROPIONATE 80 UG/1
2 AEROSOL, METERED NASAL 2 TIMES DAILY
Qty: 10.6 G | Refills: 5 | Status: SHIPPED
Start: 2022-11-18 | End: 2022-11-21 | Stop reason: SDUPTHER

## 2022-11-21 RX ORDER — BECLOMETHASONE DIPROPIONATE 80 UG/1
2 AEROSOL, METERED NASAL 2 TIMES DAILY
Qty: 10.6 G | Refills: 5 | Status: SHIPPED | OUTPATIENT
Start: 2022-11-21

## 2022-12-04 PROBLEM — Z01.818 PRE-OP TESTING: Status: RESOLVED | Noted: 2022-11-04 | Resolved: 2022-12-04

## 2022-12-29 DIAGNOSIS — F51.01 PRIMARY INSOMNIA: ICD-10-CM

## 2022-12-30 RX ORDER — ZOLPIDEM TARTRATE 5 MG/1
TABLET ORAL
Qty: 90 TABLET | Refills: 0 | Status: SHIPPED | OUTPATIENT
Start: 2022-12-30 | End: 2023-01-29

## 2023-01-16 DIAGNOSIS — J30.2 SEASONAL ALLERGIES: Primary | ICD-10-CM

## 2023-01-16 RX ORDER — BECLOMETHASONE DIPROPIONATE 80 UG/1
1 AEROSOL, METERED NASAL 2 TIMES DAILY
Qty: 10.6 G | Refills: 5 | Status: SHIPPED
Start: 2023-01-16 | End: 2023-01-18 | Stop reason: SDUPTHER

## 2023-01-18 DIAGNOSIS — J30.2 SEASONAL ALLERGIES: ICD-10-CM

## 2023-01-18 RX ORDER — BECLOMETHASONE DIPROPIONATE 80 UG/1
2 AEROSOL, METERED NASAL DAILY
Qty: 3 G | Refills: 3 | Status: SHIPPED | OUTPATIENT
Start: 2023-01-18

## 2023-03-08 RX ORDER — OMEPRAZOLE 20 MG/1
CAPSULE, DELAYED RELEASE ORAL
Qty: 90 CAPSULE | Refills: 3 | Status: SHIPPED
Start: 2023-03-08 | End: 2023-03-09 | Stop reason: SDUPTHER

## 2023-03-08 RX ORDER — OMEPRAZOLE 20 MG/1
CAPSULE, DELAYED RELEASE ORAL
Qty: 30 CAPSULE | Refills: 1 | OUTPATIENT
Start: 2023-03-08

## 2023-03-09 RX ORDER — OMEPRAZOLE 20 MG/1
CAPSULE, DELAYED RELEASE ORAL
Qty: 30 CAPSULE | Refills: 1 | Status: SHIPPED | OUTPATIENT
Start: 2023-03-09

## 2023-03-09 RX ORDER — OMEPRAZOLE 20 MG/1
CAPSULE, DELAYED RELEASE ORAL
Qty: 90 CAPSULE | Refills: 3 | Status: CANCELLED | OUTPATIENT
Start: 2023-03-09

## 2023-03-10 RX ORDER — OMEPRAZOLE 20 MG/1
CAPSULE, DELAYED RELEASE ORAL
Qty: 90 CAPSULE | Refills: 1 | OUTPATIENT
Start: 2023-03-10

## 2023-04-25 SDOH — HEALTH STABILITY: PHYSICAL HEALTH: ON AVERAGE, HOW MANY MINUTES DO YOU ENGAGE IN EXERCISE AT THIS LEVEL?: 120 MIN

## 2023-04-25 SDOH — ECONOMIC STABILITY: INCOME INSECURITY: HOW HARD IS IT FOR YOU TO PAY FOR THE VERY BASICS LIKE FOOD, HOUSING, MEDICAL CARE, AND HEATING?: NOT HARD AT ALL

## 2023-04-25 SDOH — ECONOMIC STABILITY: TRANSPORTATION INSECURITY
IN THE PAST 12 MONTHS, HAS LACK OF TRANSPORTATION KEPT YOU FROM MEETINGS, WORK, OR FROM GETTING THINGS NEEDED FOR DAILY LIVING?: NO

## 2023-04-25 SDOH — ECONOMIC STABILITY: FOOD INSECURITY: WITHIN THE PAST 12 MONTHS, THE FOOD YOU BOUGHT JUST DIDN'T LAST AND YOU DIDN'T HAVE MONEY TO GET MORE.: NEVER TRUE

## 2023-04-25 SDOH — HEALTH STABILITY: PHYSICAL HEALTH: ON AVERAGE, HOW MANY DAYS PER WEEK DO YOU ENGAGE IN MODERATE TO STRENUOUS EXERCISE (LIKE A BRISK WALK)?: 3 DAYS

## 2023-04-25 SDOH — ECONOMIC STABILITY: HOUSING INSECURITY
IN THE LAST 12 MONTHS, WAS THERE A TIME WHEN YOU DID NOT HAVE A STEADY PLACE TO SLEEP OR SLEPT IN A SHELTER (INCLUDING NOW)?: NO

## 2023-04-25 SDOH — ECONOMIC STABILITY: FOOD INSECURITY: WITHIN THE PAST 12 MONTHS, YOU WORRIED THAT YOUR FOOD WOULD RUN OUT BEFORE YOU GOT MONEY TO BUY MORE.: NEVER TRUE

## 2023-04-25 ASSESSMENT — LIFESTYLE VARIABLES
HOW OFTEN DURING THE LAST YEAR HAVE YOU FAILED TO DO WHAT WAS NORMALLY EXPECTED FROM YOU BECAUSE OF DRINKING: NEVER
HOW OFTEN DURING THE LAST YEAR HAVE YOU FAILED TO DO WHAT WAS NORMALLY EXPECTED FROM YOU BECAUSE OF DRINKING: 0
HOW MANY STANDARD DRINKS CONTAINING ALCOHOL DO YOU HAVE ON A TYPICAL DAY: 1
HOW OFTEN DURING THE LAST YEAR HAVE YOU FOUND THAT YOU WERE NOT ABLE TO STOP DRINKING ONCE YOU HAD STARTED: NEVER
HAVE YOU OR SOMEONE ELSE BEEN INJURED AS A RESULT OF YOUR DRINKING: NO
HAS A RELATIVE, FRIEND, DOCTOR, OR ANOTHER HEALTH PROFESSIONAL EXPRESSED CONCERN ABOUT YOUR DRINKING OR SUGGESTED YOU CUT DOWN: NO
HOW OFTEN DURING THE LAST YEAR HAVE YOU NEEDED AN ALCOHOLIC DRINK FIRST THING IN THE MORNING TO GET YOURSELF GOING AFTER A NIGHT OF HEAVY DRINKING: 0
HOW OFTEN DO YOU HAVE SIX OR MORE DRINKS ON ONE OCCASION: 2
HOW MANY STANDARD DRINKS CONTAINING ALCOHOL DO YOU HAVE ON A TYPICAL DAY: 1 OR 2
HOW OFTEN DURING THE LAST YEAR HAVE YOU FOUND THAT YOU WERE NOT ABLE TO STOP DRINKING ONCE YOU HAD STARTED: 0
HAVE YOU OR SOMEONE ELSE BEEN INJURED AS A RESULT OF YOUR DRINKING: 0
HOW OFTEN DURING THE LAST YEAR HAVE YOU BEEN UNABLE TO REMEMBER WHAT HAPPENED THE NIGHT BEFORE BECAUSE YOU HAD BEEN DRINKING: NEVER
HOW OFTEN DURING THE LAST YEAR HAVE YOU BEEN UNABLE TO REMEMBER WHAT HAPPENED THE NIGHT BEFORE BECAUSE YOU HAD BEEN DRINKING: 0
HOW OFTEN DURING THE LAST YEAR HAVE YOU HAD A FEELING OF GUILT OR REMORSE AFTER DRINKING: 0
HOW OFTEN DO YOU HAVE A DRINK CONTAINING ALCOHOL: 4
HAS A RELATIVE, FRIEND, DOCTOR, OR ANOTHER HEALTH PROFESSIONAL EXPRESSED CONCERN ABOUT YOUR DRINKING OR SUGGESTED YOU CUT DOWN: 0
HOW OFTEN DO YOU HAVE A DRINK CONTAINING ALCOHOL: 2-3 TIMES A WEEK
HOW OFTEN DURING THE LAST YEAR HAVE YOU HAD A FEELING OF GUILT OR REMORSE AFTER DRINKING: NEVER
HOW OFTEN DURING THE LAST YEAR HAVE YOU NEEDED AN ALCOHOLIC DRINK FIRST THING IN THE MORNING TO GET YOURSELF GOING AFTER A NIGHT OF HEAVY DRINKING: NEVER

## 2023-04-25 ASSESSMENT — PATIENT HEALTH QUESTIONNAIRE - PHQ9
SUM OF ALL RESPONSES TO PHQ QUESTIONS 1-9: 0
2. FEELING DOWN, DEPRESSED OR HOPELESS: 0
SUM OF ALL RESPONSES TO PHQ QUESTIONS 1-9: 0
1. LITTLE INTEREST OR PLEASURE IN DOING THINGS: 0
SUM OF ALL RESPONSES TO PHQ9 QUESTIONS 1 & 2: 0

## 2023-04-26 ENCOUNTER — OFFICE VISIT (OUTPATIENT)
Dept: PRIMARY CARE CLINIC | Age: 68
End: 2023-04-26

## 2023-04-26 VITALS
HEIGHT: 69 IN | TEMPERATURE: 97.8 F | OXYGEN SATURATION: 95 % | RESPIRATION RATE: 18 BRPM | BODY MASS INDEX: 32.58 KG/M2 | HEART RATE: 84 BPM | DIASTOLIC BLOOD PRESSURE: 70 MMHG | WEIGHT: 220 LBS | SYSTOLIC BLOOD PRESSURE: 118 MMHG

## 2023-04-26 DIAGNOSIS — R39.16 BENIGN PROSTATIC HYPERPLASIA (BPH) WITH STRAINING ON URINATION: ICD-10-CM

## 2023-04-26 DIAGNOSIS — E78.2 ELEVATED TRIGLYCERIDES WITH HIGH CHOLESTEROL: ICD-10-CM

## 2023-04-26 DIAGNOSIS — I10 PRIMARY HYPERTENSION: ICD-10-CM

## 2023-04-26 DIAGNOSIS — R25.2 MUSCLE CRAMPS: ICD-10-CM

## 2023-04-26 DIAGNOSIS — N40.1 BENIGN PROSTATIC HYPERPLASIA (BPH) WITH STRAINING ON URINATION: ICD-10-CM

## 2023-04-26 DIAGNOSIS — Z00.00 WELCOME TO MEDICARE PREVENTIVE VISIT: Primary | ICD-10-CM

## 2023-04-26 PROBLEM — L50.9 URTICARIA: Status: RESOLVED | Noted: 2022-09-17 | Resolved: 2023-04-26

## 2023-04-26 PROBLEM — R26.2 DISABILITY OF WALKING: Status: RESOLVED | Noted: 2018-08-08 | Resolved: 2023-04-26

## 2023-04-26 LAB
ANION GAP SERPL CALCULATED.3IONS-SCNC: 18 MMOL/L (ref 7–16)
BASOPHILS # BLD: 0.04 E9/L (ref 0–0.2)
BASOPHILS NFR BLD: 0.7 % (ref 0–2)
BUN SERPL-MCNC: 24 MG/DL (ref 6–23)
CALCIUM SERPL-MCNC: 10.1 MG/DL (ref 8.6–10.2)
CHLORIDE SERPL-SCNC: 108 MMOL/L (ref 98–107)
CHOLESTEROL, TOTAL: 179 MG/DL (ref 0–199)
CO2 SERPL-SCNC: 20 MMOL/L (ref 22–29)
CREAT SERPL-MCNC: 1 MG/DL (ref 0.7–1.2)
EOSINOPHIL # BLD: 0.18 E9/L (ref 0.05–0.5)
EOSINOPHIL NFR BLD: 3.2 % (ref 0–6)
ERYTHROCYTE [DISTWIDTH] IN BLOOD BY AUTOMATED COUNT: 13.3 FL (ref 11.5–15)
ERYTHROCYTE [SEDIMENTATION RATE] IN BLOOD BY WESTERGREN METHOD: 5 MM/HR (ref 0–15)
GLUCOSE SERPL-MCNC: 104 MG/DL (ref 74–99)
HCT VFR BLD AUTO: 44.3 % (ref 37–54)
HDLC SERPL-MCNC: 45 MG/DL
HGB BLD-MCNC: 14.7 G/DL (ref 12.5–16.5)
IMM GRANULOCYTES # BLD: 0.01 E9/L
IMM GRANULOCYTES NFR BLD: 0.2 % (ref 0–5)
LDLC SERPL CALC-MCNC: 118 MG/DL (ref 0–99)
LYMPHOCYTES # BLD: 1.53 E9/L (ref 1.5–4)
LYMPHOCYTES NFR BLD: 27.2 % (ref 20–42)
MCH RBC QN AUTO: 32.1 PG (ref 26–35)
MCHC RBC AUTO-ENTMCNC: 33.2 % (ref 32–34.5)
MCV RBC AUTO: 96.7 FL (ref 80–99.9)
MONOCYTES # BLD: 0.51 E9/L (ref 0.1–0.95)
MONOCYTES NFR BLD: 9.1 % (ref 2–12)
NEUTROPHILS # BLD: 3.36 E9/L (ref 1.8–7.3)
NEUTS SEG NFR BLD: 59.6 % (ref 43–80)
PLATELET # BLD AUTO: 151 E9/L (ref 130–450)
PMV BLD AUTO: 11.6 FL (ref 7–12)
POTASSIUM SERPL-SCNC: 5 MMOL/L (ref 3.5–5)
RBC # BLD AUTO: 4.58 E12/L (ref 3.8–5.8)
SODIUM SERPL-SCNC: 146 MMOL/L (ref 132–146)
TRIGL SERPL-MCNC: 81 MG/DL (ref 0–149)
VLDLC SERPL CALC-MCNC: 16 MG/DL
WBC # BLD: 5.6 E9/L (ref 4.5–11.5)

## 2023-04-26 SDOH — ECONOMIC STABILITY: FOOD INSECURITY: WITHIN THE PAST 12 MONTHS, THE FOOD YOU BOUGHT JUST DIDN'T LAST AND YOU DIDN'T HAVE MONEY TO GET MORE.: NEVER TRUE

## 2023-04-26 SDOH — ECONOMIC STABILITY: INCOME INSECURITY: HOW HARD IS IT FOR YOU TO PAY FOR THE VERY BASICS LIKE FOOD, HOUSING, MEDICAL CARE, AND HEATING?: NOT HARD AT ALL

## 2023-04-26 SDOH — ECONOMIC STABILITY: FOOD INSECURITY: WITHIN THE PAST 12 MONTHS, YOU WORRIED THAT YOUR FOOD WOULD RUN OUT BEFORE YOU GOT MONEY TO BUY MORE.: NEVER TRUE

## 2023-04-26 NOTE — PROGRESS NOTES
Medicare Annual Wellness Visit    Minal Hernandez is here for Medicare AWV    Assessment & Plan   Welcome to Medicare preventive visit  Primary hypertension  Benign prostatic hyperplasia (BPH) with straining on urination  Elevated triglycerides with high cholesterol  -     LIPID PANEL; Future  Muscle cramps  -     Basic Metabolic Panel; Future  -     Sedimentation Rate; Future    Recommendations for Preventive Services Due: see orders and patient instructions/AVS.  Recommended screening schedule for the next 5-10 years is provided to the patient in written form: see Patient Instructions/AVS.     Return for Medicare Annual Wellness Visit in 1 year. Subjective   The following acute and/or chronic problems were also addressed today:      Hypertension:  Patient is here for follow up chronic hypertension. This is  generally controlled on current medication regimen. Takes meds as directed and tolerates them well. Most recent labs reviewed with patient and are not remarkable. No symptoms from htn standpoint per ROS. Patient is  compliant with lifestyle modifications. Patient does not smoke. Has leg cramps. No edema. No rashes. Thinks that he drinks a lot of water. He is unsure if he takes k+, bc his wife gives supplements    Patient's complete Health Risk Assessment and screening values have been reviewed and are found in Flowsheets. The following problems were reviewed today and where indicated follow up appointments were made and/or referrals ordered. Positive Risk Factor Screenings with Interventions:         Alcohol Screening:  Alcohol Use: Heavy Drinker    Frequency of Alcohol Consumption: 2-3 times a week    Average Number of Drinks: 1 or 2    Frequency of Binge Drinking: Less than monthly      AUDIT-C Score: 4   AUDIT Total Score: 4    Interpretation of AUDIT-C score:   3-7 indicates potential alcohol risk. 8 or more is associated with harmful or hazardous drinking.    13 or more in women, and 15 or

## 2023-04-26 NOTE — PATIENT INSTRUCTIONS
idea to know your test results and keep a list of the medicines you take. How can you care for yourself at home? Set realistic goals. Many people expect to lose much more weight than is likely. A weight loss of 5% to 10% of your body weight may be enough to improve your health. Get family and friends involved to provide support. Talk to them about why you are trying to lose weight, and ask them to help. They can help by participating in exercise and having meals with you, even if they may be eating something different. Find what works best for you. If you do not have time or do not like to cook, a program that offers meal replacement bars or shakes may be better for you. Or if you like to prepare meals, finding a plan that includes daily menus and recipes may be best.  Ask your doctor about other health professionals who can help you achieve your weight loss goals. A dietitian can help you make healthy changes in your diet. An exercise specialist or  can help you develop a safe and effective exercise program.  A counselor or psychiatrist can help you cope with issues such as depression, anxiety, or family problems that can make it hard to focus on weight loss. Consider joining a support group for people who are trying to lose weight. Your doctor can suggest groups in your area. Where can you learn more? Go to http://www.woods.com/ and enter U357 to learn more about \"Starting a Weight Loss Plan: Care Instructions. \"  Current as of: May 9, 2022               Content Version: 13.6  © 2006-2023 [employer]. Care instructions adapted under license by [facility] "[employer]" [facility] ([facility]). If you have questions about a medical condition or this instruction, always ask your healthcare professional. [place] [address] any warranty or liability for your use of this information.            A Healthy Heart: Care Instructions  Your Care Instructions     Coronary artery disease, also

## 2023-05-18 ENCOUNTER — TELEPHONE (OUTPATIENT)
Dept: PRIMARY CARE CLINIC | Age: 68
End: 2023-05-18

## 2023-05-18 NOTE — TELEPHONE ENCOUNTER
----- Message from Reginald Villatoro sent at 5/18/2023 12:12 PM EDT -----  Subject: Message to Provider    QUESTIONS  Information for Provider? Patient attempted to reach the practice   regarding a billing issue/issue with coding for the appointment on   10/24/2022. Patient requests a call back. Patient requesting a status   regarding this issue and if it was taken care of?   ---------------------------------------------------------------------------  --------------  [id number]  [phone]; OK to leave message on voicemail  ---------------------------------------------------------------------------  --------------  SCRIPT ANSWERS  Relationship to Patient?  Self

## 2023-05-30 DIAGNOSIS — F51.01 PRIMARY INSOMNIA: ICD-10-CM

## 2023-05-31 RX ORDER — ZOLPIDEM TARTRATE 5 MG/1
TABLET ORAL
Qty: 90 TABLET | Refills: 0 | Status: SHIPPED | OUTPATIENT
Start: 2023-05-31 | End: 2023-06-30

## 2023-10-01 ENCOUNTER — HOSPITAL ENCOUNTER (EMERGENCY)
Age: 68
Discharge: LEFT BEFORE DISCHARGE | End: 2023-10-01

## 2023-10-01 NOTE — ED NOTES
Pt came to triage desk and stated that he is going to leave and call PCP in the morning. Attempt to convince pt to stay unsuccessful. Pt signed ED withdraw of medical treatment form.      Srinivas Evans RN  10/01/23 9545

## 2023-10-02 ENCOUNTER — HOSPITAL ENCOUNTER (EMERGENCY)
Age: 68
Discharge: LEFT BEFORE DISCHARGE | End: 2023-10-02

## 2023-10-25 SDOH — HEALTH STABILITY: PHYSICAL HEALTH: ON AVERAGE, HOW MANY DAYS PER WEEK DO YOU ENGAGE IN MODERATE TO STRENUOUS EXERCISE (LIKE A BRISK WALK)?: 0 DAYS

## 2023-10-25 SDOH — HEALTH STABILITY: PHYSICAL HEALTH: ON AVERAGE, HOW MANY MINUTES DO YOU ENGAGE IN EXERCISE AT THIS LEVEL?: 0 MIN

## 2023-10-25 ASSESSMENT — LIFESTYLE VARIABLES
HOW OFTEN DURING THE LAST YEAR HAVE YOU FOUND THAT YOU WERE NOT ABLE TO STOP DRINKING ONCE YOU HAD STARTED: NEVER
HOW OFTEN DURING THE LAST YEAR HAVE YOU FAILED TO DO WHAT WAS NORMALLY EXPECTED FROM YOU BECAUSE OF DRINKING: NEVER
HOW OFTEN DURING THE LAST YEAR HAVE YOU BEEN UNABLE TO REMEMBER WHAT HAPPENED THE NIGHT BEFORE BECAUSE YOU HAD BEEN DRINKING: 0
HAVE YOU OR SOMEONE ELSE BEEN INJURED AS A RESULT OF YOUR DRINKING: NO
HAS A RELATIVE, FRIEND, DOCTOR, OR ANOTHER HEALTH PROFESSIONAL EXPRESSED CONCERN ABOUT YOUR DRINKING OR SUGGESTED YOU CUT DOWN: NO
HOW MANY STANDARD DRINKS CONTAINING ALCOHOL DO YOU HAVE ON A TYPICAL DAY: 1 OR 2
HOW MANY STANDARD DRINKS CONTAINING ALCOHOL DO YOU HAVE ON A TYPICAL DAY: 1
HAVE YOU OR SOMEONE ELSE BEEN INJURED AS A RESULT OF YOUR DRINKING: 0
HOW OFTEN DURING THE LAST YEAR HAVE YOU NEEDED AN ALCOHOLIC DRINK FIRST THING IN THE MORNING TO GET YOURSELF GOING AFTER A NIGHT OF HEAVY DRINKING: NEVER
HOW OFTEN DURING THE LAST YEAR HAVE YOU NEEDED AN ALCOHOLIC DRINK FIRST THING IN THE MORNING TO GET YOURSELF GOING AFTER A NIGHT OF HEAVY DRINKING: 0
HOW OFTEN DURING THE LAST YEAR HAVE YOU FOUND THAT YOU WERE NOT ABLE TO STOP DRINKING ONCE YOU HAD STARTED: 0
HOW OFTEN DURING THE LAST YEAR HAVE YOU BEEN UNABLE TO REMEMBER WHAT HAPPENED THE NIGHT BEFORE BECAUSE YOU HAD BEEN DRINKING: NEVER
HOW OFTEN DO YOU HAVE A DRINK CONTAINING ALCOHOL: 4
HOW OFTEN DO YOU HAVE A DRINK CONTAINING ALCOHOL: 2-3 TIMES A WEEK
HAS A RELATIVE, FRIEND, DOCTOR, OR ANOTHER HEALTH PROFESSIONAL EXPRESSED CONCERN ABOUT YOUR DRINKING OR SUGGESTED YOU CUT DOWN: 0
HOW OFTEN DURING THE LAST YEAR HAVE YOU HAD A FEELING OF GUILT OR REMORSE AFTER DRINKING: NEVER
HOW OFTEN DO YOU HAVE SIX OR MORE DRINKS ON ONE OCCASION: 2
HOW OFTEN DURING THE LAST YEAR HAVE YOU FAILED TO DO WHAT WAS NORMALLY EXPECTED FROM YOU BECAUSE OF DRINKING: 0
HOW OFTEN DURING THE LAST YEAR HAVE YOU HAD A FEELING OF GUILT OR REMORSE AFTER DRINKING: 0

## 2023-10-25 ASSESSMENT — PATIENT HEALTH QUESTIONNAIRE - PHQ9
SUM OF ALL RESPONSES TO PHQ QUESTIONS 1-9: 0
2. FEELING DOWN, DEPRESSED OR HOPELESS: 0
1. LITTLE INTEREST OR PLEASURE IN DOING THINGS: 0
SUM OF ALL RESPONSES TO PHQ QUESTIONS 1-9: 0
SUM OF ALL RESPONSES TO PHQ QUESTIONS 1-9: 0
SUM OF ALL RESPONSES TO PHQ9 QUESTIONS 1 & 2: 0
SUM OF ALL RESPONSES TO PHQ QUESTIONS 1-9: 0

## 2023-11-03 ENCOUNTER — OFFICE VISIT (OUTPATIENT)
Dept: PRIMARY CARE CLINIC | Age: 68
End: 2023-11-03

## 2023-11-03 VITALS
BODY MASS INDEX: 32.73 KG/M2 | HEART RATE: 75 BPM | DIASTOLIC BLOOD PRESSURE: 84 MMHG | TEMPERATURE: 97.6 F | SYSTOLIC BLOOD PRESSURE: 136 MMHG | RESPIRATION RATE: 16 BRPM | OXYGEN SATURATION: 97 % | WEIGHT: 221 LBS | HEIGHT: 69 IN

## 2023-11-03 DIAGNOSIS — M54.42 ACUTE LEFT-SIDED LOW BACK PAIN WITH LEFT-SIDED SCIATICA: ICD-10-CM

## 2023-11-03 DIAGNOSIS — R25.2 MUSCLE CRAMPS: ICD-10-CM

## 2023-11-03 DIAGNOSIS — I10 PRIMARY HYPERTENSION: ICD-10-CM

## 2023-11-03 DIAGNOSIS — E55.9 VITAMIN D DEFICIENCY: ICD-10-CM

## 2023-11-03 DIAGNOSIS — E78.00 ELEVATED LDL CHOLESTEROL LEVEL: ICD-10-CM

## 2023-11-03 DIAGNOSIS — Z00.00 MEDICARE ANNUAL WELLNESS VISIT, SUBSEQUENT: Primary | ICD-10-CM

## 2023-11-03 LAB
ABSOLUTE IMMATURE GRANULOCYTE: <0.03 K/UL (ref 0–0.58)
ALBUMIN SERPL-MCNC: 4.4 G/DL (ref 3.5–5.2)
ALP BLD-CCNC: 68 U/L (ref 40–129)
ALT SERPL-CCNC: 23 U/L (ref 0–40)
ANION GAP SERPL CALCULATED.3IONS-SCNC: 16 MMOL/L (ref 7–16)
AST SERPL-CCNC: 28 U/L (ref 0–39)
BASOPHILS ABSOLUTE: 0.04 K/UL (ref 0–0.2)
BASOPHILS RELATIVE PERCENT: 1 % (ref 0–2)
BILIRUB SERPL-MCNC: 0.8 MG/DL (ref 0–1.2)
BUN BLDV-MCNC: 18 MG/DL (ref 6–23)
CALCIUM SERPL-MCNC: 9.6 MG/DL (ref 8.6–10.2)
CHLORIDE BLD-SCNC: 105 MMOL/L (ref 98–107)
CHOLESTEROL: 199 MG/DL
CO2: 22 MMOL/L (ref 22–29)
CREAT SERPL-MCNC: 1 MG/DL (ref 0.7–1.2)
EOSINOPHILS ABSOLUTE: 0.23 K/UL (ref 0.05–0.5)
EOSINOPHILS RELATIVE PERCENT: 4 % (ref 0–6)
FOLATE: >20 NG/ML (ref 4.8–24.2)
GFR SERPL CREATININE-BSD FRML MDRD: >60 ML/MIN/1.73M2
GLUCOSE BLD-MCNC: 102 MG/DL (ref 74–99)
HCT VFR BLD CALC: 43.8 % (ref 37–54)
HDLC SERPL-MCNC: 49 MG/DL
HEMOGLOBIN: 14.8 G/DL (ref 12.5–16.5)
IMMATURE GRANULOCYTES: 0 % (ref 0–5)
LDL CHOLESTEROL: 124 MG/DL
LYMPHOCYTES ABSOLUTE: 1.51 K/UL (ref 1.5–4)
LYMPHOCYTES RELATIVE PERCENT: 24 % (ref 20–42)
MAGNESIUM: 2.2 MG/DL (ref 1.6–2.6)
MCH RBC QN AUTO: 32.3 PG (ref 26–35)
MCHC RBC AUTO-ENTMCNC: 33.8 G/DL (ref 32–34.5)
MCV RBC AUTO: 95.6 FL (ref 80–99.9)
MONOCYTES ABSOLUTE: 0.52 K/UL (ref 0.1–0.95)
MONOCYTES RELATIVE PERCENT: 8 % (ref 2–12)
NEUTROPHILS ABSOLUTE: 3.93 K/UL (ref 1.8–7.3)
NEUTROPHILS RELATIVE PERCENT: 63 % (ref 43–80)
PDW BLD-RTO: 13.1 % (ref 11.5–15)
PLATELET # BLD: 155 K/UL (ref 130–450)
PMV BLD AUTO: 11.2 FL (ref 7–12)
POTASSIUM SERPL-SCNC: 4.5 MMOL/L (ref 3.5–5)
RBC # BLD: 4.58 M/UL (ref 3.8–5.8)
SODIUM BLD-SCNC: 143 MMOL/L (ref 132–146)
TOTAL PROTEIN: 7.3 G/DL (ref 6.4–8.3)
TRIGL SERPL-MCNC: 129 MG/DL
TSH SERPL DL<=0.05 MIU/L-ACNC: 1.64 UIU/ML (ref 0.27–4.2)
VITAMIN B-12: 785 PG/ML (ref 211–946)
VITAMIN D 25-HYDROXY: 60.3 NG/ML (ref 30–100)
VLDLC SERPL CALC-MCNC: 26 MG/DL
WBC # BLD: 6.2 K/UL (ref 4.5–11.5)

## 2023-11-03 RX ORDER — TIZANIDINE 4 MG/1
4 TABLET ORAL EVERY 6 HOURS PRN
Qty: 30 TABLET | Refills: 0 | Status: SHIPPED | OUTPATIENT
Start: 2023-11-03

## 2023-11-03 RX ORDER — METHYLPREDNISOLONE 4 MG/1
TABLET ORAL
Qty: 1 KIT | Refills: 0 | Status: SHIPPED | OUTPATIENT
Start: 2023-11-03 | End: 2023-11-09

## 2023-11-03 NOTE — PROGRESS NOTES
well developed and well- nourished, in no acute distress  Skin: warm and dry, no rash or erythema  Head: normocephalic and atraumatic  Eyes: pupils equal, round, and reactive to light, extraocular eye movements intact, conjunctivae normal  ENT: tympanic membrane, external ear and ear canal normal bilaterally, nose without deformity, nasal mucosa and turbinates normal without polyps  Neck: supple and non-tender without mass, no thyromegaly or thyroid nodules, no cervical lymphadenopathy  Pulmonary/Chest: clear to auscultation bilaterally- no wheezes, rales or rhonchi, normal air movement, no respiratory distress  Cardiovascular: normal rate, regular rhythm, normal S1 and S2, no murmurs, rubs, clicks, or gallops, distal pulses intact, no carotid bruits  Abdomen: soft, non-tender, non-distended, normal bowel sounds, no masses or organomegaly  Extremities: no cyanosis, clubbing or edema  Musculoskeletal: normal range of motion, no joint swelling, deformity or tenderness  +Lumbar paraspinal tenderness  Neurologic: reflexes normal and symmetric, no cranial nerve deficit, gait, coordination and speech normal       Allergies   Allergen Reactions    Penicillins Hives    Bee Venom      Other reaction(s): Unknown  Other reaction(s): Unknown     Prior to Visit Medications    Medication Sig Taking?  Authorizing Provider   methylPREDNISolone (MEDROL, NGOC,) 4 MG tablet Take as directed Yes Narda Starr PA-C   tiZANidine (ZANAFLEX) 4 MG tablet Take 1 tablet by mouth every 6 hours as needed (pain) Yes Naomi TERRELL PA-C   omeprazole (PRILOSEC) 20 MG delayed release capsule TAKE 1 CAPSULE DAILY Yes Akua Platt   QNASL 80 MCG/ACT AERS nasal spray 2 sprays by Each Nostril route daily Yes Naomi TERRELL PA-C   montelukast (SINGULAIR) 10 MG tablet Take 1 tablet by mouth daily Yes Naomi TERRELL PA-C   EPINEPHrine (EPIPEN) 0.3 MG/0.3ML SOAJ injection Inject 0.3 mLs into the muscle once for 1 dose Yes Narda Starr PA-C

## 2023-11-07 ENCOUNTER — TELEPHONE (OUTPATIENT)
Dept: PRIMARY CARE CLINIC | Age: 68
End: 2023-11-07

## 2023-11-07 NOTE — TELEPHONE ENCOUNTER
----- Message from Kirt Robles sent at 11/7/2023  3:01 PM EST -----  Subject: Results Request    QUESTIONS  Results: labs;  Ordered by: Omi Newman   Date Performed: 2023-11-03  ---------------------------------------------------------------------------  --------------  Socrates WILSONYZ    [phone]; OK to leave message on voicemail  ---------------------------------------------------------------------------  --------------

## 2023-11-07 NOTE — TELEPHONE ENCOUNTER
----- Message from Puneet Parham sent at 11/7/2023  3:01 PM EST -----  Subject: Results Request    QUESTIONS  Results: labs;  Ordered by: Rica Valera Performed: 2023-11-03  ---------------------------------------------------------------------------  --------------  Maximilian Carney GPTJ    [phone]; OK to leave message on voicemail  ---------------------------------------------------------------------------  --------------

## 2023-11-08 NOTE — TELEPHONE ENCOUNTER
Pt notified also pt states he has been having leg cramps for about 8 weeks now and would like to know if there is anything in his blood work to reflect on that.

## 2023-11-17 DIAGNOSIS — M54.42 ACUTE LEFT-SIDED LOW BACK PAIN WITH LEFT-SIDED SCIATICA: ICD-10-CM

## 2023-11-17 RX ORDER — TIZANIDINE 4 MG/1
4 TABLET ORAL EVERY 6 HOURS PRN
Qty: 30 TABLET | Refills: 0 | Status: SHIPPED | OUTPATIENT
Start: 2023-11-17

## 2023-11-17 RX ORDER — PREDNISONE 10 MG/1
TABLET ORAL
Qty: 30 TABLET | Refills: 0 | OUTPATIENT
Start: 2023-11-17

## 2023-12-12 DIAGNOSIS — I10 PRIMARY HYPERTENSION: ICD-10-CM

## 2023-12-12 DIAGNOSIS — J30.2 SEASONAL ALLERGIES: ICD-10-CM

## 2023-12-13 RX ORDER — MONTELUKAST SODIUM 10 MG/1
10 TABLET ORAL DAILY
Qty: 90 TABLET | Refills: 3 | Status: SHIPPED | OUTPATIENT
Start: 2023-12-13

## 2023-12-13 RX ORDER — LISINOPRIL 30 MG/1
TABLET ORAL
Qty: 90 TABLET | Refills: 3 | Status: SHIPPED | OUTPATIENT
Start: 2023-12-13

## 2024-02-14 DIAGNOSIS — F51.01 PRIMARY INSOMNIA: ICD-10-CM

## 2024-02-14 RX ORDER — ZOLPIDEM TARTRATE 5 MG/1
5 TABLET ORAL NIGHTLY PRN
Qty: 90 TABLET | Refills: 0 | Status: SHIPPED | OUTPATIENT
Start: 2024-02-14 | End: 2024-05-14

## 2024-04-05 RX ORDER — OMEPRAZOLE 20 MG/1
CAPSULE, DELAYED RELEASE ORAL
Qty: 90 CAPSULE | Refills: 1 | Status: SHIPPED | OUTPATIENT
Start: 2024-04-05

## 2024-05-21 DIAGNOSIS — F51.01 PRIMARY INSOMNIA: ICD-10-CM

## 2024-05-22 RX ORDER — ZOLPIDEM TARTRATE 5 MG/1
5 TABLET ORAL NIGHTLY PRN
Qty: 90 TABLET | Refills: 0 | Status: SHIPPED | OUTPATIENT
Start: 2024-05-22 | End: 2024-08-20

## 2024-06-26 ENCOUNTER — OFFICE VISIT (OUTPATIENT)
Dept: PRIMARY CARE CLINIC | Age: 69
End: 2024-06-26

## 2024-06-26 VITALS
SYSTOLIC BLOOD PRESSURE: 110 MMHG | OXYGEN SATURATION: 94 % | BODY MASS INDEX: 32.29 KG/M2 | WEIGHT: 218 LBS | HEIGHT: 69 IN | HEART RATE: 100 BPM | TEMPERATURE: 97.4 F | DIASTOLIC BLOOD PRESSURE: 60 MMHG | RESPIRATION RATE: 16 BRPM

## 2024-06-26 DIAGNOSIS — Z98.1 S/P LAMINECTOMY WITH SPINAL FUSION: ICD-10-CM

## 2024-06-26 DIAGNOSIS — Z48.02 ENCOUNTER FOR STAPLE REMOVAL: ICD-10-CM

## 2024-06-26 DIAGNOSIS — M51.9 LUMBAR DISC DISEASE: Primary | ICD-10-CM

## 2024-06-26 RX ORDER — GABAPENTIN 300 MG/1
300 CAPSULE ORAL 3 TIMES DAILY
COMMUNITY
Start: 2024-05-16 | End: 2024-07-17

## 2024-06-26 RX ORDER — METHOCARBAMOL 750 MG/1
750 TABLET, FILM COATED ORAL 4 TIMES DAILY
COMMUNITY
Start: 2024-06-18

## 2024-06-26 RX ORDER — OXYCODONE HYDROCHLORIDE 5 MG/1
10 TABLET ORAL 4 TIMES DAILY
COMMUNITY
Start: 2024-06-18

## 2024-06-26 SDOH — ECONOMIC STABILITY: FOOD INSECURITY: WITHIN THE PAST 12 MONTHS, YOU WORRIED THAT YOUR FOOD WOULD RUN OUT BEFORE YOU GOT MONEY TO BUY MORE.: NEVER TRUE

## 2024-06-26 SDOH — ECONOMIC STABILITY: INCOME INSECURITY: HOW HARD IS IT FOR YOU TO PAY FOR THE VERY BASICS LIKE FOOD, HOUSING, MEDICAL CARE, AND HEATING?: NOT HARD AT ALL

## 2024-06-26 SDOH — ECONOMIC STABILITY: FOOD INSECURITY: WITHIN THE PAST 12 MONTHS, THE FOOD YOU BOUGHT JUST DIDN'T LAST AND YOU DIDN'T HAVE MONEY TO GET MORE.: NEVER TRUE

## 2024-06-26 ASSESSMENT — PATIENT HEALTH QUESTIONNAIRE - PHQ9
SUM OF ALL RESPONSES TO PHQ QUESTIONS 1-9: 0
SUM OF ALL RESPONSES TO PHQ QUESTIONS 1-9: 0
2. FEELING DOWN, DEPRESSED OR HOPELESS: NOT AT ALL
SUM OF ALL RESPONSES TO PHQ QUESTIONS 1-9: 0
SUM OF ALL RESPONSES TO PHQ QUESTIONS 1-9: 0
SUM OF ALL RESPONSES TO PHQ9 QUESTIONS 1 & 2: 0
1. LITTLE INTEREST OR PLEASURE IN DOING THINGS: NOT AT ALL

## 2024-06-26 NOTE — PROGRESS NOTES
Chief Complaint   Patient presents with    Suture / Staple Removal     On his back       HPI:  Patient is here for follow-up of surgical staple removal.  Placed in [place] on 6/14 where he had laminectomy on 6/14. Dr Krishnan is willing to allow us to take them out locally. He was supposed to have them in 10 -12 days, and today is day 12. We called that office to confirm that this is appropriate. Mar talked office staff and they were supposed to fax over an order.     Patient's past medical, surgical, social and/or family history reviewed, updated in chart, and are non-contributory (unless otherwise stated).  Medications and allergies also reviewed and updated in chart.    Review of Systems:  Constitutional:  No fever, no fatigue, no chills, no headaches, no weight change  Dermatology:  No rash, no mole, no dry or sensitive skin  ENT:  No cough, no sore throat, no sinus pain, no runny nose, no ear pain  Cardiology:  No chest pain, no palpitations, no leg edema, no shortness of breath, no PND  Gastroenterology:  No dysphagia, no abdominal pain, no nausea, no vomiting, no constipation, no diarrhea, no heartburn  Musculoskeletal:  No joint pain, no leg cramps, no back pain, no muscle aches  Respiratory:  No shortness of breath, no orthopnea, no wheezing, no FRANK, no hemoptysis  Urology:  No blood in the urine, no urinary frequency, no urinary incontinence, no urinary urgency, no nocturia, no dysuria    Vitals:    06/26/24 1222   BP: 110/60   Pulse: 100   Resp: 16   Temp: 97.4 °F (36.3 °C)   SpO2: 94%   Weight: 98.9 kg (218 lb)   Height: 1.753 m (5' 9.02\")       Physical Exam  Constitutional:       General: He is not in acute distress.     Appearance: Normal appearance. He is well-developed.   HENT:      Head: Normocephalic and atraumatic.      Right Ear: External ear normal.      Left Ear: External ear normal.      Nose: Nose normal.   Eyes:      General: No scleral icterus.     Conjunctiva/sclera:

## 2024-08-28 DIAGNOSIS — F51.01 PRIMARY INSOMNIA: ICD-10-CM

## 2024-08-28 RX ORDER — ZOLPIDEM TARTRATE 5 MG/1
TABLET ORAL
Qty: 90 TABLET | Refills: 0 | Status: SHIPPED | OUTPATIENT
Start: 2024-08-28 | End: 2024-11-26

## 2024-09-07 DIAGNOSIS — J30.2 SEASONAL ALLERGIES: ICD-10-CM

## 2024-09-09 RX ORDER — BECLOMETHASONE DIPROPIONATE 80 UG/1
2 AEROSOL, METERED NASAL DAILY
Qty: 31.8 G | Refills: 3 | Status: SHIPPED | OUTPATIENT
Start: 2024-09-09

## 2024-11-03 DIAGNOSIS — I10 PRIMARY HYPERTENSION: ICD-10-CM

## 2024-11-03 DIAGNOSIS — J30.2 SEASONAL ALLERGIES: ICD-10-CM

## 2024-11-04 RX ORDER — LISINOPRIL 30 MG/1
TABLET ORAL
Qty: 90 TABLET | Refills: 0 | Status: SHIPPED | OUTPATIENT
Start: 2024-11-04

## 2024-11-04 RX ORDER — MONTELUKAST SODIUM 10 MG/1
10 TABLET ORAL DAILY
Qty: 90 TABLET | Refills: 0 | Status: SHIPPED | OUTPATIENT
Start: 2024-11-04

## 2024-11-04 NOTE — TELEPHONE ENCOUNTER
Name of Medication(s) Requested:  Requested Prescriptions     Pending Prescriptions Disp Refills    montelukast (SINGULAIR) 10 MG tablet [Pharmacy Med Name: MONTELUKAST SODIUM TABS 10MG] 90 tablet 0     Sig: TAKE 1 TABLET DAILY    lisinopril (PRINIVIL;ZESTRIL) 30 MG tablet [Pharmacy Med Name: LISINOPRIL TABS 30MG] 90 tablet 0     Sig: TAKE 1 TABLET DAILY       Medication is on current medication list Yes    Dosage and directions were verified? Yes    Quantity verified: 90 day supply     Pharmacy Verified?  Yes    Last Appointment:  6/26/2024    Future appts:  Future Appointments   Date Time Provider Department Center   11/8/2024 11:45 AM Anita Zelaya PA-C [facility]        (If no appt send self scheduling link. .REFILLAPPT)  Scheduling request sent?     [] Yes  [] No    Does patient need updated?  [] Yes  [] No

## 2024-11-08 ENCOUNTER — OFFICE VISIT (OUTPATIENT)
Dept: PRIMARY CARE CLINIC | Age: 69
End: 2024-11-08

## 2024-11-08 VITALS
SYSTOLIC BLOOD PRESSURE: 118 MMHG | HEART RATE: 100 BPM | BODY MASS INDEX: 33.77 KG/M2 | WEIGHT: 228 LBS | HEIGHT: 69 IN | OXYGEN SATURATION: 94 % | DIASTOLIC BLOOD PRESSURE: 78 MMHG | RESPIRATION RATE: 16 BRPM | TEMPERATURE: 97.4 F

## 2024-11-08 DIAGNOSIS — R25.2 LEG CRAMPS: ICD-10-CM

## 2024-11-08 DIAGNOSIS — E78.00 ELEVATED LDL CHOLESTEROL LEVEL: ICD-10-CM

## 2024-11-08 DIAGNOSIS — I10 PRIMARY HYPERTENSION: ICD-10-CM

## 2024-11-08 DIAGNOSIS — K59.00 CONSTIPATION, UNSPECIFIED CONSTIPATION TYPE: ICD-10-CM

## 2024-11-08 DIAGNOSIS — Z13.1 ENCOUNTER FOR SCREENING FOR DIABETES MELLITUS: ICD-10-CM

## 2024-11-08 DIAGNOSIS — Z91.030 BEE STING ALLERGY: ICD-10-CM

## 2024-11-08 DIAGNOSIS — E55.9 VITAMIN D DEFICIENCY: ICD-10-CM

## 2024-11-08 DIAGNOSIS — Z00.00 MEDICARE ANNUAL WELLNESS VISIT, SUBSEQUENT: Primary | ICD-10-CM

## 2024-11-08 LAB
ALBUMIN: 4.4 G/DL (ref 3.5–5.2)
ALP BLD-CCNC: 74 U/L (ref 40–129)
ALT SERPL-CCNC: 30 U/L (ref 0–40)
ANION GAP SERPL CALCULATED.3IONS-SCNC: 17 MMOL/L (ref 7–16)
AST SERPL-CCNC: 32 U/L (ref 0–39)
BASOPHILS ABSOLUTE: 0.05 K/UL (ref 0–0.2)
BASOPHILS RELATIVE PERCENT: 1 % (ref 0–2)
BILIRUB SERPL-MCNC: 0.7 MG/DL (ref 0–1.2)
BUN BLDV-MCNC: 16 MG/DL (ref 6–23)
CALCIUM SERPL-MCNC: 9.5 MG/DL (ref 8.6–10.2)
CHLORIDE BLD-SCNC: 102 MMOL/L (ref 98–107)
CHOLESTEROL, TOTAL: 190 MG/DL
CO2: 20 MMOL/L (ref 22–29)
CREAT SERPL-MCNC: 1 MG/DL (ref 0.7–1.2)
EOSINOPHILS ABSOLUTE: 0.9 K/UL (ref 0.05–0.5)
EOSINOPHILS RELATIVE PERCENT: 13 % (ref 0–6)
GFR, ESTIMATED: 85 ML/MIN/1.73M2
GLUCOSE BLD-MCNC: 138 MG/DL (ref 74–99)
HCT VFR BLD CALC: 43.2 % (ref 37–54)
HDLC SERPL-MCNC: 42 MG/DL
HEMOGLOBIN: 14.4 G/DL (ref 12.5–16.5)
IMMATURE GRANULOCYTES %: 0 % (ref 0–5)
IMMATURE GRANULOCYTES ABSOLUTE: <0.03 K/UL (ref 0–0.58)
LDL CHOLESTEROL: 119 MG/DL
LYMPHOCYTES ABSOLUTE: 1.59 K/UL (ref 1.5–4)
LYMPHOCYTES RELATIVE PERCENT: 23 % (ref 20–42)
MAGNESIUM: 2.2 MG/DL (ref 1.6–2.6)
MCH RBC QN AUTO: 31.5 PG (ref 26–35)
MCHC RBC AUTO-ENTMCNC: 33.3 G/DL (ref 32–34.5)
MCV RBC AUTO: 94.5 FL (ref 80–99.9)
MONOCYTES ABSOLUTE: 0.48 K/UL (ref 0.1–0.95)
MONOCYTES RELATIVE PERCENT: 7 % (ref 2–12)
NEUTROPHILS ABSOLUTE: 3.84 K/UL (ref 1.8–7.3)
NEUTROPHILS RELATIVE PERCENT: 56 % (ref 43–80)
PDW BLD-RTO: 13.6 % (ref 11.5–15)
PLATELET # BLD: 168 K/UL (ref 130–450)
PMV BLD AUTO: 12.2 FL (ref 7–12)
POTASSIUM SERPL-SCNC: 4.1 MMOL/L (ref 3.5–5)
RBC # BLD: 4.57 M/UL (ref 3.8–5.8)
SODIUM BLD-SCNC: 139 MMOL/L (ref 132–146)
T4 FREE: 1.1 NG/DL (ref 0.9–1.7)
TOTAL PROTEIN: 6.8 G/DL (ref 6.4–8.3)
TRIGL SERPL-MCNC: 144 MG/DL
TSH SERPL DL<=0.05 MIU/L-ACNC: 0.99 UIU/ML (ref 0.27–4.2)
VLDLC SERPL CALC-MCNC: 29 MG/DL
WBC # BLD: 6.9 K/UL (ref 4.5–11.5)

## 2024-11-08 RX ORDER — EPINEPHRINE 0.3 MG/.3ML
0.3 INJECTION SUBCUTANEOUS ONCE
Qty: 2 EACH | Refills: 3 | Status: SHIPPED | OUTPATIENT
Start: 2024-11-08 | End: 2024-11-08

## 2024-11-08 RX ORDER — TIRZEPATIDE 2.5 MG/.5ML
2.5 INJECTION, SOLUTION SUBCUTANEOUS WEEKLY
Qty: 3 ML | Refills: 1 | Status: SHIPPED | OUTPATIENT
Start: 2024-11-08

## 2024-11-08 ASSESSMENT — PATIENT HEALTH QUESTIONNAIRE - PHQ9
SUM OF ALL RESPONSES TO PHQ QUESTIONS 1-9: 0
2. FEELING DOWN, DEPRESSED OR HOPELESS: NOT AT ALL
SUM OF ALL RESPONSES TO PHQ9 QUESTIONS 1 & 2: 0
1. LITTLE INTEREST OR PLEASURE IN DOING THINGS: NOT AT ALL

## 2024-11-08 ASSESSMENT — LIFESTYLE VARIABLES
HOW MANY STANDARD DRINKS CONTAINING ALCOHOL DO YOU HAVE ON A TYPICAL DAY: 1 OR 2
HOW OFTEN DO YOU HAVE A DRINK CONTAINING ALCOHOL: MONTHLY OR LESS

## 2024-11-08 NOTE — PROGRESS NOTES
Medicare Annual Wellness Visit    Zechariah Vazquez is here for Medicare AWV and Hypertension    Assessment & Plan   Medicare annual wellness visit, subsequent  Encounter for screening for diabetes mellitus  Constipation, unspecified constipation type  -     TSH  -     T4, Free  -     XR ABDOMEN (KUB) (SINGLE AP VIEW); Future  Leg cramps  -     Comprehensive Metabolic Panel  -     CBC with Auto Differential  -     Magnesium  Primary hypertension  -     Comprehensive Metabolic Panel  Vitamin D deficiency  Elevated LDL cholesterol level  -     Lipid Panel  BMI 33.0-33.9,adult  -     tirzepatide-weight management (ZEPBOUND) 2.5 MG/0.5ML SOAJ subCUTAneous auto-injector pen; Inject 2.5 mg into the skin once a week, Disp-3 mL, R-1Normal  Bee sting allergy  -     EPINEPHrine (EPIPEN) 0.3 MG/0.3ML SOAJ injection; Inject 0.3 mLs into the muscle once for 1 dose, Disp-2 each, R-3Normal    Recommendations for Preventive Services Due: see orders and patient instructions/AVS.  Recommended screening schedule for the next 5-10 years is provided to the patient in written form: see Patient Instructions/AVS.     Return for Medicare Annual Wellness Visit in 1 year.     Subjective   The following acute and/or chronic problems were also addressed today:  Leg cramps. He believes that he is well hydrated. No edema.   He also gets \"Stomach spasm.\"  Sometimes constipation and diarrhea. Going multiple times per day  He has miralax at home  Wants to lose 50 lbs, and is working on diet and exercising.  Taking all meds as directed.       Patient's complete Health Risk Assessment and screening values have been reviewed and are found in Flowsheets. The following problems were reviewed today and where indicated follow up appointments were made and/or referrals ordered.    Positive Risk Factor Screenings with Interventions:          Controlled Medication Review:    Today's Pain Level: No data recorded   Opioid Risk: (Low risk score <55) Opioid risk

## 2024-11-08 NOTE — PATIENT INSTRUCTIONS
Learning About Being Active as an Older Adult  Why is being active important as you get older?     Being active is one of the best things you can do for your health. And it's never too late to start. Being active--or getting active, if you aren't already--has definite benefits. It can:  Give you more energy,  Keep your mind sharp.  Improve balance to reduce your risk of falls.  Help you manage chronic illness with fewer medicines.  No matter how old you are, how fit you are, or what health problems you have, there is a form of activity that will work for you. And the more physical activity you can do, the better your overall health will be.  What kinds of activity can help you stay healthy?  Being more active will make your daily activities easier. Physical activity includes planned exercise and things you do in daily life. There are four types of activity:  Aerobic.  Doing aerobic activity makes your heart and lungs strong.  Includes walking, dancing, and gardening.  Aim for at least 2½ hours spread throughout the week.  It improves your energy and can help you sleep better.  Muscle-strengthening.  This type of activity can help maintain muscle and strengthen bones.  Includes climbing stairs, using resistance bands, and lifting or carrying heavy loads.  Aim for at least twice a week.  It can help protect the knees and other joints.  Stretching.  Stretching gives you better range of motion in joints and muscles.  Includes upper arm stretches, calf stretches, and gentle yoga.  Aim for at least twice a week, preferably after your muscles are warmed up from other activities.  It can help you function better in daily life.  Balancing.  This helps you stay coordinated and have good posture.  Includes heel-to-toe walking, mi chi, and certain types of yoga.  Aim for at least 3 days a week.  It can reduce your risk of falling.  Even if you have a hard time meeting the recommendations, it's better to be more active

## 2024-11-12 DIAGNOSIS — K59.00 CONSTIPATION, UNSPECIFIED CONSTIPATION TYPE: ICD-10-CM

## 2024-11-20 ENCOUNTER — NURSE ONLY (OUTPATIENT)
Dept: PRIMARY CARE CLINIC | Age: 69
End: 2024-11-20
Payer: MEDICARE

## 2024-11-20 DIAGNOSIS — F51.01 PRIMARY INSOMNIA: ICD-10-CM

## 2024-11-20 DIAGNOSIS — R73.9 BLOOD GLUCOSE ELEVATED: Primary | ICD-10-CM

## 2024-11-20 LAB — HBA1C MFR BLD: 5.4 %

## 2024-11-20 PROCEDURE — 83036 HEMOGLOBIN GLYCOSYLATED A1C: CPT | Performed by: PHYSICIAN ASSISTANT

## 2024-11-20 RX ORDER — ZOLPIDEM TARTRATE 5 MG/1
5 TABLET ORAL NIGHTLY PRN
Qty: 90 TABLET | Refills: 0 | Status: SHIPPED | OUTPATIENT
Start: 2024-11-20 | End: 2025-02-18

## 2024-11-20 NOTE — TELEPHONE ENCOUNTER
Name of Medication(s) Requested:  Requested Prescriptions     Pending Prescriptions Disp Refills    zolpidem (AMBIEN) 5 MG tablet 90 tablet 0     Sig: Take 1 tablet by mouth nightly as needed for Sleep for up to 90 days. Max Daily Amount: 5 mg       Medication is on current medication list Yes    Dosage and directions were verified? Yes    Quantity verified: 90 day supply     Pharmacy Verified?  Yes    Last Appointment:  11/8/2024    Future appts:  No future appointments.     (If no appt send self scheduling link. .REFILLAPPT)  Scheduling request sent?     [] Yes  [] No    Does patient need updated?  [] Yes  [] No

## 2024-12-13 ENCOUNTER — TELEPHONE (OUTPATIENT)
Dept: PRIMARY CARE CLINIC | Age: 69
End: 2024-12-13

## 2024-12-13 NOTE — TELEPHONE ENCOUNTER
PT NEEDS PRIOR AUTH FOR ZEPBOUND , RESENT TO [phone] CALLED SPOKE WITH [employer] CASE NUMBER [id number] DENIED LETTERS WILL BE FAXED AND MAILED TO PT

## 2025-01-08 LAB — PSA SERPL-MCNC: 2.31 NG/ML (ref 0–4)

## 2025-01-09 ENCOUNTER — TELEPHONE (OUTPATIENT)
Dept: PRIMARY CARE CLINIC | Age: 70
End: 2025-01-09

## 2025-01-09 NOTE — TELEPHONE ENCOUNTER
Pt calling he spoke with [facility] he would like rx for zepbound /wegovy for weight loss. It was denied previous but its a new year. He states he has back issues and weight loss will help.

## 2025-02-19 ENCOUNTER — TRANSCRIBE ORDERS (OUTPATIENT)
Dept: ADMINISTRATIVE | Age: 70
End: 2025-02-19

## 2025-02-19 DIAGNOSIS — D29.4 BENIGN NEOPLASM OF SCROTUM: ICD-10-CM

## 2025-02-19 DIAGNOSIS — N40.1 ENLARGED PROSTATE WITH URINARY OBSTRUCTION: Primary | ICD-10-CM

## 2025-02-19 DIAGNOSIS — N13.8 ENLARGED PROSTATE WITH URINARY OBSTRUCTION: Primary | ICD-10-CM

## 2025-02-26 ENCOUNTER — TRANSCRIBE ORDERS (OUTPATIENT)
Dept: ADMINISTRATIVE | Age: 70
End: 2025-02-26

## 2025-02-26 DIAGNOSIS — D29.4 BENIGN NEOPLASM OF SCROTUM: Primary | ICD-10-CM

## 2025-02-26 DIAGNOSIS — N40.1 ENLARGED PROSTATE WITH URINARY OBSTRUCTION: ICD-10-CM

## 2025-02-26 DIAGNOSIS — N13.8 ENLARGED PROSTATE WITH URINARY OBSTRUCTION: ICD-10-CM

## 2025-03-08 DIAGNOSIS — I10 PRIMARY HYPERTENSION: ICD-10-CM

## 2025-03-10 RX ORDER — LISINOPRIL 30 MG/1
30 TABLET ORAL DAILY
Qty: 90 TABLET | Refills: 3 | Status: SHIPPED | OUTPATIENT
Start: 2025-03-10

## 2025-03-10 NOTE — TELEPHONE ENCOUNTER
Name of Medication(s) Requested:  Requested Prescriptions     Pending Prescriptions Disp Refills    lisinopril (PRINIVIL;ZESTRIL) 30 MG tablet [Pharmacy Med Name: LISINOPRIL TABS 30MG] 90 tablet 3     Sig: TAKE 1 TABLET DAILY       Medication is on current medication list Yes    Dosage and directions were verified? Yes    Quantity verified: 90 day supply     Pharmacy Verified?  Yes    Last Appointment:  11/8/2024    Future appts:  No future appointments.     (If no appt send self scheduling link. .REFILLAPPT)  Scheduling request sent?     [] Yes  [] No    Does patient need updated?  [] Yes  [] No

## 2025-05-15 DIAGNOSIS — F51.01 PRIMARY INSOMNIA: ICD-10-CM

## 2025-05-15 DIAGNOSIS — J30.2 SEASONAL ALLERGIES: ICD-10-CM

## 2025-05-15 NOTE — TELEPHONE ENCOUNTER
Name of Medication(s) Requested:  Requested Prescriptions     Pending Prescriptions Disp Refills    zolpidem (AMBIEN) 5 MG tablet [Pharmacy Med Name: ZOLPIDEM TARTRATE TABS 5MG] 90 tablet 0     Sig: TAKE 1 TABLET NIGHTLY AS NEEDED FOR SLEEP FOR UP TO 90 DAYS, MAXIMUM DAILY DOSE 1 TABLET    montelukast (SINGULAIR) 10 MG tablet [Pharmacy Med Name: MONTELUKAST SODIUM TABS 10MG] 90 tablet 3     Sig: TAKE 1 TABLET DAILY       Medication is on current medication list Yes    Dosage and directions were verified? Yes    Quantity verified: 90 day supply     Pharmacy Verified?  Yes    Last Appointment:  11/8/2024    Future appts:  No future appointments.     (If no appt send self scheduling link. .REFILLAPPT)  Scheduling request sent?     [] Yes  [] No    Does patient need updated?  [] Yes  [] No

## 2025-05-16 RX ORDER — ZOLPIDEM TARTRATE 5 MG/1
TABLET ORAL
Qty: 30 TABLET | Refills: 0 | Status: SHIPPED | OUTPATIENT
Start: 2025-05-16 | End: 2025-06-15

## 2025-05-16 RX ORDER — MONTELUKAST SODIUM 10 MG/1
10 TABLET ORAL DAILY
Qty: 90 TABLET | Refills: 3 | Status: SHIPPED | OUTPATIENT
Start: 2025-05-16

## 2025-06-13 ENCOUNTER — OFFICE VISIT (OUTPATIENT)
Dept: PRIMARY CARE CLINIC | Age: 70
End: 2025-06-13

## 2025-06-13 VITALS
OXYGEN SATURATION: 97 % | WEIGHT: 223 LBS | HEART RATE: 98 BPM | DIASTOLIC BLOOD PRESSURE: 62 MMHG | SYSTOLIC BLOOD PRESSURE: 124 MMHG | TEMPERATURE: 97.5 F | BODY MASS INDEX: 33.03 KG/M2 | HEIGHT: 69 IN | RESPIRATION RATE: 18 BRPM

## 2025-06-13 DIAGNOSIS — E55.9 VITAMIN D DEFICIENCY: ICD-10-CM

## 2025-06-13 DIAGNOSIS — Z79.899 MEDICATION MANAGEMENT: ICD-10-CM

## 2025-06-13 DIAGNOSIS — I10 PRIMARY HYPERTENSION: Primary | ICD-10-CM

## 2025-06-13 DIAGNOSIS — E78.00 ELEVATED LDL CHOLESTEROL LEVEL: ICD-10-CM

## 2025-06-13 DIAGNOSIS — F51.01 PRIMARY INSOMNIA: ICD-10-CM

## 2025-06-13 LAB
ALBUMIN: 4.2 G/DL (ref 3.5–5.2)
ALP BLD-CCNC: 70 U/L (ref 40–129)
ALT SERPL-CCNC: 41 U/L (ref 0–50)
ANION GAP SERPL CALCULATED.3IONS-SCNC: 14 MMOL/L (ref 7–16)
AST SERPL-CCNC: 79 U/L (ref 0–50)
BASOPHILS ABSOLUTE: 0.05 K/UL (ref 0–0.2)
BASOPHILS RELATIVE PERCENT: 1 % (ref 0–2)
BILIRUB SERPL-MCNC: 1.2 MG/DL (ref 0–1.2)
BUN BLDV-MCNC: 27 MG/DL (ref 8–23)
CALCIUM SERPL-MCNC: 9.7 MG/DL (ref 8.8–10.2)
CHLORIDE BLD-SCNC: 104 MMOL/L (ref 98–107)
CHOLESTEROL, TOTAL: 163 MG/DL
CO2: 21 MMOL/L (ref 22–29)
CREAT SERPL-MCNC: 1.2 MG/DL (ref 0.7–1.2)
EOSINOPHILS ABSOLUTE: 0.16 K/UL (ref 0.05–0.5)
EOSINOPHILS RELATIVE PERCENT: 2 % (ref 0–6)
GFR, ESTIMATED: 64 ML/MIN/1.73M2
GLUCOSE BLD-MCNC: 117 MG/DL (ref 74–99)
HCT VFR BLD CALC: 41.9 % (ref 37–54)
HDLC SERPL-MCNC: 57 MG/DL
HEMOGLOBIN: 14.3 G/DL (ref 12.5–16.5)
IMMATURE GRANULOCYTES %: 0 % (ref 0–5)
IMMATURE GRANULOCYTES ABSOLUTE: 0.03 K/UL (ref 0–0.58)
LDL CHOLESTEROL: 88 MG/DL
LYMPHOCYTES ABSOLUTE: 1.5 K/UL (ref 1.5–4)
LYMPHOCYTES RELATIVE PERCENT: 17 % (ref 20–42)
MCH RBC QN AUTO: 32.9 PG (ref 26–35)
MCHC RBC AUTO-ENTMCNC: 34.1 G/DL (ref 32–34.5)
MCV RBC AUTO: 96.5 FL (ref 80–99.9)
MONOCYTES ABSOLUTE: 0.7 K/UL (ref 0.1–0.95)
MONOCYTES RELATIVE PERCENT: 8 % (ref 2–12)
NEUTROPHILS ABSOLUTE: 6.54 K/UL (ref 1.8–7.3)
NEUTROPHILS RELATIVE PERCENT: 73 % (ref 43–80)
PDW BLD-RTO: 13.3 % (ref 11.5–15)
PLATELET # BLD: 156 K/UL (ref 130–450)
PMV BLD AUTO: 11.9 FL (ref 7–12)
POTASSIUM SERPL-SCNC: 4.7 MMOL/L (ref 3.5–5.1)
RBC # BLD: 4.34 M/UL (ref 3.8–5.8)
SODIUM BLD-SCNC: 139 MMOL/L (ref 136–145)
TOTAL PROTEIN: 6.8 G/DL (ref 6.4–8.3)
TRIGL SERPL-MCNC: 92 MG/DL
TSH SERPL DL<=0.05 MIU/L-ACNC: 1.15 UIU/ML (ref 0.27–4.2)
VITAMIN D 25-HYDROXY: 63.2 NG/ML (ref 30–100)
VLDLC SERPL CALC-MCNC: 18 MG/DL
WBC # BLD: 9 K/UL (ref 4.5–11.5)

## 2025-06-13 SDOH — ECONOMIC STABILITY: FOOD INSECURITY: WITHIN THE PAST 12 MONTHS, THE FOOD YOU BOUGHT JUST DIDN'T LAST AND YOU DIDN'T HAVE MONEY TO GET MORE.: NEVER TRUE

## 2025-06-13 SDOH — ECONOMIC STABILITY: FOOD INSECURITY: WITHIN THE PAST 12 MONTHS, YOU WORRIED THAT YOUR FOOD WOULD RUN OUT BEFORE YOU GOT MONEY TO BUY MORE.: NEVER TRUE

## 2025-06-13 ASSESSMENT — PATIENT HEALTH QUESTIONNAIRE - PHQ9
SUM OF ALL RESPONSES TO PHQ QUESTIONS 1-9: 0
2. FEELING DOWN, DEPRESSED OR HOPELESS: NOT AT ALL
SUM OF ALL RESPONSES TO PHQ QUESTIONS 1-9: 0
1. LITTLE INTEREST OR PLEASURE IN DOING THINGS: NOT AT ALL

## 2025-06-13 NOTE — PROGRESS NOTES
Hypertension:  Patient is here for follow up chronic hypertension.  This is  generally controlled on current medication regimen. Takes meds as directed and tolerates them well.  Most recent labs reviewed with patient and are not remarkable.  No symptoms from htn standpoint per ROS.  Patient is not compliant with lifestyle modifications.  Patient does not smoke.  Comorbid conditions include bmi 32.      Patient's past medical, surgical, social and/or family history reviewed, updated in chart, and are non-contributory (unless otherwise stated).  Medications and allergies also reviewed and updated in chart.        Review of Systems:  Constitutional:  No fever, no fatigue, no chills, no headaches, no weight change  Dermatology:  No rash, no mole, no dry or sensitive skin  ENT:  No cough, no sore throat, no sinus pain, no runny nose, no ear pain  Cardiology:  No chest pain, no palpitations, no leg edema, no shortness of breath, no PND  Gastroenterology:  No dysphagia, no abdominal pain, no nausea, no vomiting, no constipation, no diarrhea, no heartburn  Musculoskeletal:  No joint pain, no leg cramps, no back pain, no muscle aches  Respiratory:  No shortness of breath, no orthopnea, no wheezing, no FRANK, no hemoptysis  Urology:  No blood in the urine, no urinary frequency, no urinary incontinence, no urinary urgency, no nocturia, no dysuria    Vitals:    06/13/25 1258   BP: 124/62   Pulse: 98   Resp: 18   Temp: 97.5 °F (36.4 °C)   SpO2: 97%   Weight: 101.2 kg (223 lb)   Height: 1.753 m (5' 9\")       Physical Exam  Constitutional:       General: He is not in acute distress.     Appearance: Normal appearance. He is well-developed. He is obese.   HENT:      Head: Normocephalic and atraumatic.      Right Ear: External ear normal.      Left Ear: External ear normal.      Nose: Nose normal.   Eyes:      General: No scleral icterus.     Conjunctiva/sclera: Conjunctivae normal.      Pupils: Pupils are equal, round, and

## 2025-06-16 ENCOUNTER — RESULTS FOLLOW-UP (OUTPATIENT)
Dept: PRIMARY CARE CLINIC | Age: 70
End: 2025-06-16

## 2025-06-16 LAB
6-MONOACETYLMORPHINE, URINE: NEGATIVE
ABNORMAL SPECIMEN VALIDITY TEST: ABNORMAL
ALCOHOL URINE: NOT DETECTED MG/DL
AMPHETAMINE SCREEN URINE: NEGATIVE
BARBITURATE SCREEN URINE: NEGATIVE
BENZODIAZEPINE SCREEN, URINE: NEGATIVE
BUPRENORPHINE URINE: NEGATIVE
CANNABINOID SCREEN URINE: NEGATIVE
COCAINE METABOLITE, URINE: NEGATIVE
FENTANYL URINE: NEGATIVE
INTEGRITY CHECK, CREATININE, URINE: 363 MG/DL (ref 22–250)
INTEGRITY CHECK, OXIDANT, URINE: <5 MG/L
INTEGRITY CHECK, PH, URINE: 5.6 (ref 4.5–8)
INTEGRITY CHECK, SPECIFIC GRAVITY, URINE: 1.03 (ref 1–1.03)
METHADONE SCREEN, URINE: NEGATIVE
OPIATES, URINE: NEGATIVE
OXYCODONE SCREEN URINE: NEGATIVE
PCP,URINE: NEGATIVE
TEST INFORMATION: ABNORMAL
TRAMADOL, URINE: NEGATIVE

## 2025-08-12 DIAGNOSIS — F51.01 PRIMARY INSOMNIA: ICD-10-CM

## 2025-08-12 RX ORDER — ZOLPIDEM TARTRATE 5 MG/1
TABLET ORAL
Qty: 30 TABLET | Refills: 0 | Status: SHIPPED | OUTPATIENT
Start: 2025-08-12 | End: 2025-09-11

## 2025-08-15 RX ORDER — OMEPRAZOLE 20 MG/1
20 CAPSULE, DELAYED RELEASE ORAL DAILY
Qty: 90 CAPSULE | Refills: 3 | Status: SHIPPED | OUTPATIENT
Start: 2025-08-15

## 2025-08-27 ENCOUNTER — TELEPHONE (OUTPATIENT)
Dept: PRIMARY CARE CLINIC | Age: 70
End: 2025-08-27

## 2025-08-27 ENCOUNTER — OFFICE VISIT (OUTPATIENT)
Dept: PRIMARY CARE CLINIC | Age: 70
End: 2025-08-27

## 2025-08-27 VITALS
SYSTOLIC BLOOD PRESSURE: 105 MMHG | WEIGHT: 223 LBS | TEMPERATURE: 97 F | BODY MASS INDEX: 33.03 KG/M2 | RESPIRATION RATE: 16 BRPM | HEIGHT: 69 IN | HEART RATE: 100 BPM | OXYGEN SATURATION: 96 % | DIASTOLIC BLOOD PRESSURE: 68 MMHG

## 2025-08-27 DIAGNOSIS — J01.00 ACUTE NON-RECURRENT MAXILLARY SINUSITIS: Primary | ICD-10-CM

## 2025-08-27 RX ORDER — FLUTICASONE PROPIONATE 50 MCG
2 SPRAY, SUSPENSION (ML) NASAL DAILY
Qty: 16 G | Refills: 0 | Status: SHIPPED | OUTPATIENT
Start: 2025-08-27

## 2025-08-27 RX ORDER — DOXYCYCLINE HYCLATE 100 MG
100 TABLET ORAL 2 TIMES DAILY
Qty: 20 TABLET | Refills: 0 | Status: SHIPPED | OUTPATIENT
Start: 2025-08-27 | End: 2025-09-06

## (undated) DEVICE — INSUFFLATION NEEDLE TO ESTABLISH PNEUMOPERITONEUM.: Brand: INSUFFLATION NEEDLE

## (undated) DEVICE — STAPLER INT DIA5MM 25 ABSRB STRP FIX DISP FOR HERN MESH

## (undated) DEVICE — GLOVE SURG SZ 8 L12IN FNGR THK79MIL GRN LTX FREE

## (undated) DEVICE — TROCAR: Brand: KII FIOS FIRST ENTRY

## (undated) DEVICE — WARMER SCP 2 ANTIFOG LAP DISP

## (undated) DEVICE — GLOVE ORANGE PI 7 1/2   MSG9075

## (undated) DEVICE — DRAPE,REIN 53X77,STERILE: Brand: MEDLINE

## (undated) DEVICE — ELECTRODE PT RET AD L9FT HI MOIST COND ADH HYDRGEL CORDED

## (undated) DEVICE — WARMER SCP LAP

## (undated) DEVICE — LAPAROSCOPIC SCISSORS: Brand: EPIX LAPAROSCOPIC SCISSORS

## (undated) DEVICE — TROCAR: Brand: KII® SLEEVE

## (undated) DEVICE — GENERAL LAPAROSCOPY: Brand: MEDLINE INDUSTRIES, INC.